# Patient Record
Sex: MALE | Race: BLACK OR AFRICAN AMERICAN | NOT HISPANIC OR LATINO | URBAN - METROPOLITAN AREA
[De-identification: names, ages, dates, MRNs, and addresses within clinical notes are randomized per-mention and may not be internally consistent; named-entity substitution may affect disease eponyms.]

---

## 2017-08-10 ENCOUNTER — EMERGENCY (EMERGENCY)
Facility: HOSPITAL | Age: 72
LOS: 1 days | Discharge: HOME | End: 2017-08-10
Attending: EMERGENCY MEDICINE | Admitting: EMERGENCY MEDICINE
Payer: MEDICARE

## 2017-08-10 VITALS
DIASTOLIC BLOOD PRESSURE: 84 MMHG | SYSTOLIC BLOOD PRESSURE: 178 MMHG | OXYGEN SATURATION: 100 % | TEMPERATURE: 98 F | HEART RATE: 77 BPM | RESPIRATION RATE: 15 BRPM

## 2017-08-10 VITALS
DIASTOLIC BLOOD PRESSURE: 87 MMHG | TEMPERATURE: 98 F | HEART RATE: 80 BPM | RESPIRATION RATE: 16 BRPM | OXYGEN SATURATION: 99 % | SYSTOLIC BLOOD PRESSURE: 183 MMHG

## 2017-08-10 LAB
ALBUMIN SERPL ELPH-MCNC: 4.1 G/DL — SIGNIFICANT CHANGE UP (ref 3.3–5)
ALP SERPL-CCNC: 70 U/L — SIGNIFICANT CHANGE UP (ref 40–120)
ALT FLD-CCNC: 12 U/L — SIGNIFICANT CHANGE UP (ref 4–41)
APTT BLD: 30.4 SEC — SIGNIFICANT CHANGE UP (ref 27.5–37.4)
AST SERPL-CCNC: 15 U/L — SIGNIFICANT CHANGE UP (ref 4–40)
BASOPHILS # BLD AUTO: 0.02 K/UL — SIGNIFICANT CHANGE UP (ref 0–0.2)
BASOPHILS NFR BLD AUTO: 0.5 % — SIGNIFICANT CHANGE UP (ref 0–2)
BILIRUB SERPL-MCNC: 0.4 MG/DL — SIGNIFICANT CHANGE UP (ref 0.2–1.2)
BUN SERPL-MCNC: 15 MG/DL — SIGNIFICANT CHANGE UP (ref 7–23)
CALCIUM SERPL-MCNC: 9.4 MG/DL — SIGNIFICANT CHANGE UP (ref 8.4–10.5)
CHLORIDE SERPL-SCNC: 103 MMOL/L — SIGNIFICANT CHANGE UP (ref 98–107)
CK MB BLD-MCNC: 3.25 NG/ML — SIGNIFICANT CHANGE UP (ref 1–6.6)
CK MB BLD-MCNC: SIGNIFICANT CHANGE UP (ref 0–2.5)
CK SERPL-CCNC: 131 U/L — SIGNIFICANT CHANGE UP (ref 30–200)
CO2 SERPL-SCNC: 28 MMOL/L — SIGNIFICANT CHANGE UP (ref 22–31)
CREAT SERPL-MCNC: 1.13 MG/DL — SIGNIFICANT CHANGE UP (ref 0.5–1.3)
EOSINOPHIL # BLD AUTO: 0.13 K/UL — SIGNIFICANT CHANGE UP (ref 0–0.5)
EOSINOPHIL NFR BLD AUTO: 3 % — SIGNIFICANT CHANGE UP (ref 0–6)
GLUCOSE SERPL-MCNC: 359 MG/DL — HIGH (ref 70–99)
HCT VFR BLD CALC: 38.5 % — LOW (ref 39–50)
HGB BLD-MCNC: 12.5 G/DL — LOW (ref 13–17)
IMM GRANULOCYTES # BLD AUTO: 0.01 # — SIGNIFICANT CHANGE UP
IMM GRANULOCYTES NFR BLD AUTO: 0.2 % — SIGNIFICANT CHANGE UP (ref 0–1.5)
INR BLD: 1.02 — SIGNIFICANT CHANGE UP (ref 0.88–1.17)
LYMPHOCYTES # BLD AUTO: 1.16 K/UL — SIGNIFICANT CHANGE UP (ref 1–3.3)
LYMPHOCYTES # BLD AUTO: 26.5 % — SIGNIFICANT CHANGE UP (ref 13–44)
MCHC RBC-ENTMCNC: 27.6 PG — SIGNIFICANT CHANGE UP (ref 27–34)
MCHC RBC-ENTMCNC: 32.5 % — SIGNIFICANT CHANGE UP (ref 32–36)
MCV RBC AUTO: 85 FL — SIGNIFICANT CHANGE UP (ref 80–100)
MONOCYTES # BLD AUTO: 0.39 K/UL — SIGNIFICANT CHANGE UP (ref 0–0.9)
MONOCYTES NFR BLD AUTO: 8.9 % — SIGNIFICANT CHANGE UP (ref 2–14)
NEUTROPHILS # BLD AUTO: 2.67 K/UL — SIGNIFICANT CHANGE UP (ref 1.8–7.4)
NEUTROPHILS NFR BLD AUTO: 60.9 % — SIGNIFICANT CHANGE UP (ref 43–77)
NRBC # FLD: 0 — SIGNIFICANT CHANGE UP
PLATELET # BLD AUTO: 208 K/UL — SIGNIFICANT CHANGE UP (ref 150–400)
PMV BLD: 9.5 FL — SIGNIFICANT CHANGE UP (ref 7–13)
POTASSIUM SERPL-MCNC: 3.6 MMOL/L — SIGNIFICANT CHANGE UP (ref 3.5–5.3)
POTASSIUM SERPL-SCNC: 3.6 MMOL/L — SIGNIFICANT CHANGE UP (ref 3.5–5.3)
PROT SERPL-MCNC: 6.9 G/DL — SIGNIFICANT CHANGE UP (ref 6–8.3)
PROTHROM AB SERPL-ACNC: 11.4 SEC — SIGNIFICANT CHANGE UP (ref 9.8–13.1)
RBC # BLD: 4.53 M/UL — SIGNIFICANT CHANGE UP (ref 4.2–5.8)
RBC # FLD: 12.6 % — SIGNIFICANT CHANGE UP (ref 10.3–14.5)
SODIUM SERPL-SCNC: 141 MMOL/L — SIGNIFICANT CHANGE UP (ref 135–145)
TROPONIN T SERPL-MCNC: < 0.06 NG/ML — SIGNIFICANT CHANGE UP (ref 0–0.06)
WBC # BLD: 4.38 K/UL — SIGNIFICANT CHANGE UP (ref 3.8–10.5)
WBC # FLD AUTO: 4.38 K/UL — SIGNIFICANT CHANGE UP (ref 3.8–10.5)

## 2017-08-10 PROCEDURE — 72170 X-RAY EXAM OF PELVIS: CPT | Mod: 26

## 2017-08-10 PROCEDURE — 99284 EMERGENCY DEPT VISIT MOD MDM: CPT | Mod: GC

## 2017-08-10 RX ORDER — ACETAMINOPHEN 500 MG
650 TABLET ORAL ONCE
Qty: 0 | Refills: 0 | Status: DISCONTINUED | OUTPATIENT
Start: 2017-08-10 | End: 2017-08-14

## 2017-08-10 NOTE — ED PROVIDER NOTE - MEDICAL DECISION MAKING DETAILS
72 yo M presenting 4 weeks s/p fall from rolling off bed. Pt walks independently since.  Pt had some pain in left hip (4/10 in severity) since the fall but it worsened last night (to 7/10). Pt denies LOC, no head trauma with fall. Stable in ED, vitals normal other than elevated BP. No other symptoms other than hip pain which is located on the iliac bone on the lateral left side. xray hip shows no fracture. Discharging pt with follow-up with his PMD.

## 2017-08-10 NOTE — ED ADULT TRIAGE NOTE - CHIEF COMPLAINT QUOTE
Pt states he rolled off of bed last night. Denies any kind of syncope or dizziness. Pt c.o left sided pain. Pt takes ASA daily. Pt also c/o productive cough and constipation.

## 2017-08-10 NOTE — ED PROVIDER NOTE - PLAN OF CARE
Explained to pt that xray shows no fracture where his pain is located. Pt should follow-up with PMD in 24 hours. Explained the pt can take tylenol for pain as needed. Pt should return to the ED if pt experiences increase in pain, weakness, or dizziness.

## 2017-08-10 NOTE — ED PROVIDER NOTE - MUSCULOSKELETAL MINIMAL EXAM
L hip flexion limited 2/2 pain, otherwise FROM in L hip; no thoracic or lumbar midline TTP/RANGE OF MOTION LIMITED

## 2017-08-10 NOTE — ED PROVIDER NOTE - CARE PLAN
Principal Discharge DX:	Hip pain, left  Instructions for follow-up, activity and diet:	Explained to pt that xray shows no fracture where his pain is located. Pt should follow-up with PMD in 24 hours. Explained the pt can take tylenol for pain as needed. Pt should return to the ED if pt experiences increase in pain, weakness, or dizziness.

## 2017-08-10 NOTE — ED PROVIDER NOTE - OBJECTIVE STATEMENT
Pt is a 72 yo M presenting 4 weeks s/p fall. Pt fell out of bed 4 weeks ago while sleeping "too close to the edge of the bed." No hx of falls. Pt walks independently. Pt fell onto his left side. Pt had some pain in left hip (about 4/10 in severity) since the fall but it worsened last night (to 7/10). Pt has taken tylenol for the pain but it didn't improve pain. Pain from L hip is constant and sharp and sometimes radiates to the left lower lateral back. Pt denies LOC, no head trauma     Meds: carvedilol, atorvastatin, novalog, lantus    PCP: Luke  Pharmacy: [facility] [address] [place] [address] Pt is a 70 yo M presenting 4 weeks s/p fall. Pt fell out of bed 4 weeks ago while sleeping "too close to the edge of the bed." No hx of falls. Pt walks independently. Pt fell onto his left side. Pt had some pain in left hip (about 4/10 in severity) since the fall but it worsened last night (to 7/10). Pt has taken tylenol for the pain but it didn't improve pain. Pain from L hip is constant and sharp and sometimes radiates to the left lower lateral back. Pt denies LOC, no head trauma. Pt has had some gait instability since the original fall but no falls since then.    Meds: carvedilol, atorvastatin, novalog, lantus    PCP: Luke  Pharmacy: [facility] [address] [place] [address]

## 2017-08-10 NOTE — ED PROVIDER NOTE - PROGRESS NOTE DETAILS
[place] - Reassessed pt. Stable, feeling well, vitals nl. Pain is at a zero when sitting, now improved when standing. Explained to pt that xray shows no fracture. Pt should follow-up with PMD in 24 hours. Explained the pt can take tylenol for pain as needed.

## 2017-08-10 NOTE — ED ADULT NURSE NOTE - OBJECTIVE STATEMENT
Eve RN- pt c/o of left sided rib pain s/p mechanical fall 3 weeks ago; pt states he rolled out of bed in his sleep. pt on aspirin, has hx of two MI's, (unsure of the year), DM, HTN. pt is a/ox3, ambulatory at baseline, vitally stable in NAD. pt has alzheimer's, with short term memory loss. IV 20g placed to right AC, labs sent. had off report given to RN, A. Symes. will continue to monitor

## 2017-12-26 ENCOUNTER — EMERGENCY (EMERGENCY)
Facility: HOSPITAL | Age: 72
LOS: 0 days | Discharge: HOME | End: 2017-12-26
Attending: EMERGENCY MEDICINE | Admitting: EMERGENCY MEDICINE
Payer: COMMERCIAL

## 2017-12-26 VITALS
HEIGHT: 75 IN | WEIGHT: 250 LBS | SYSTOLIC BLOOD PRESSURE: 166 MMHG | TEMPERATURE: 98 F | DIASTOLIC BLOOD PRESSURE: 93 MMHG | HEART RATE: 62 BPM | OXYGEN SATURATION: 100 % | RESPIRATION RATE: 17 BRPM

## 2017-12-26 VITALS
HEART RATE: 70 BPM | OXYGEN SATURATION: 99 % | TEMPERATURE: 98 F | RESPIRATION RATE: 18 BRPM | DIASTOLIC BLOOD PRESSURE: 78 MMHG | SYSTOLIC BLOOD PRESSURE: 154 MMHG

## 2017-12-26 DIAGNOSIS — E11.649 TYPE 2 DIABETES MELLITUS WITH HYPOGLYCEMIA WITHOUT COMA: ICD-10-CM

## 2017-12-26 DIAGNOSIS — Z79.4 LONG TERM (CURRENT) USE OF INSULIN: ICD-10-CM

## 2017-12-26 PROCEDURE — 99285 EMERGENCY DEPT VISIT HI MDM: CPT

## 2017-12-26 NOTE — ED ADULT NURSE NOTE - OBJECTIVE STATEMENT
Patient BIB ambulance with c/o hypoglycemia. Per patient, he checked his sugar this morning and it was 67, gave himself Humalog 24 units, had breakfast. Later was found with tongue sticking out and barely responsive. FS as per EMS was 21, was given D50 amp, increased to 98 in the field. Fs at ED 80. Meal provided. #20 to left ac prior to arrival. Patient presents to ED A&Ox4, but forgetful. Daughter at bedside.

## 2017-12-26 NOTE — ED PROVIDER NOTE - MEDICAL DECISION MAKING DETAILS
patient observed in the ED approximately 2 hrs with BGM improvement. patient appropriate for discharge home. precautions when to return to the ED given and understood.

## 2017-12-26 NOTE — ED ADULT TRIAGE NOTE - CHIEF COMPLAINT QUOTE
pt took too much insulin this morning, blood sugar was 21 upon ems arrival and was 80 upon arrival to ED after 1 amp of D50 given by EMS

## 2017-12-26 NOTE — ED PROVIDER NOTE - OBJECTIVE STATEMENT
71 y/o M with a PMHx of DM on insulin, HTN presents to the ED c/o hypoglycemic episode that occurred earlier today while at home. Pt states that his BGM when he woke up this morning was in the 60s, he ate, took his insulin, began to shake later in the morning. EMS states that upon their arrival his BGM was in the 20s, 1amp Dx50 given, BGM in 80s upon arrival to the ED. Pt currently awake, alert here in ED and denies pain, fever, cough, chills, NVD, abd pain, CP or any other acute c/o at this time.

## 2018-07-16 PROBLEM — I21.3 ST ELEVATION (STEMI) MYOCARDIAL INFARCTION OF UNSPECIFIED SITE: Chronic | Status: ACTIVE | Noted: 2017-08-10

## 2018-09-21 NOTE — ED ADULT NURSE NOTE - NS ED PATIENT SAFETY CONCERN
"Prescription approved per [facility] Refill Protocol.     SUKUMAR-7 SCORE 11/20/2015 2/10/2017 3/14/2018   Total Score - - -   Total Score - - 0 (minimal anxiety)   Total Score 1 0 0     Requested Prescriptions   Pending Prescriptions Disp Refills     sertraline (ZOLOFT) 100 MG tablet [Pharmacy Med Name: SERTRALINE  MG TABLET] 90 tablet 1     Sig: TAKE 1 TABLET (100 MG) BY MOUTH DAILY    SSRIs Protocol Passed    9/21/2018 12:24 PM       Passed - Recent (12 mo) or future (30 days) visit within the authorizing provider's specialty    Patient had office visit in the last 12 months or has a visit in the next 30 days with authorizing provider or within the authorizing provider's specialty.  See \"Patient Info\" tab in inbasket, or \"Choose Columns\" in Meds & Orders section of the refill encounter.           Passed - Patient is age 18 or older        Kerrie Beaver RN      " No

## 2019-11-05 ENCOUNTER — INPATIENT (INPATIENT)
Facility: HOSPITAL | Age: 74
LOS: 2 days | Discharge: HOME HEALTH | End: 2019-11-08
Attending: HOSPITALIST | Admitting: HOSPITALIST
Payer: MEDICARE

## 2019-11-05 VITALS
HEART RATE: 81 BPM | SYSTOLIC BLOOD PRESSURE: 161 MMHG | TEMPERATURE: 98 F | DIASTOLIC BLOOD PRESSURE: 89 MMHG | RESPIRATION RATE: 18 BRPM | OXYGEN SATURATION: 100 %

## 2019-11-05 DIAGNOSIS — Z29.9 ENCOUNTER FOR PROPHYLACTIC MEASURES, UNSPECIFIED: ICD-10-CM

## 2019-11-05 DIAGNOSIS — D72.829 ELEVATED WHITE BLOOD CELL COUNT, UNSPECIFIED: ICD-10-CM

## 2019-11-05 DIAGNOSIS — F03.90 UNSPECIFIED DEMENTIA WITHOUT BEHAVIORAL DISTURBANCE: ICD-10-CM

## 2019-11-05 DIAGNOSIS — E11.649 TYPE 2 DIABETES MELLITUS WITH HYPOGLYCEMIA WITHOUT COMA: ICD-10-CM

## 2019-11-05 DIAGNOSIS — Z02.9 ENCOUNTER FOR ADMINISTRATIVE EXAMINATIONS, UNSPECIFIED: ICD-10-CM

## 2019-11-05 DIAGNOSIS — E16.2 HYPOGLYCEMIA, UNSPECIFIED: ICD-10-CM

## 2019-11-05 DIAGNOSIS — E87.6 HYPOKALEMIA: ICD-10-CM

## 2019-11-05 DIAGNOSIS — I63.9 CEREBRAL INFARCTION, UNSPECIFIED: ICD-10-CM

## 2019-11-05 DIAGNOSIS — I25.10 ATHEROSCLEROTIC HEART DISEASE OF NATIVE CORONARY ARTERY WITHOUT ANGINA PECTORIS: Chronic | ICD-10-CM

## 2019-11-05 DIAGNOSIS — I10 ESSENTIAL (PRIMARY) HYPERTENSION: ICD-10-CM

## 2019-11-05 DIAGNOSIS — I25.10 ATHEROSCLEROTIC HEART DISEASE OF NATIVE CORONARY ARTERY WITHOUT ANGINA PECTORIS: ICD-10-CM

## 2019-11-05 DIAGNOSIS — N18.3 CHRONIC KIDNEY DISEASE, STAGE 3 (MODERATE): ICD-10-CM

## 2019-11-05 PROBLEM — E11.9 TYPE 2 DIABETES MELLITUS WITHOUT COMPLICATIONS: Chronic | Status: ACTIVE | Noted: 2017-12-26

## 2019-11-05 PROBLEM — E11.9 TYPE 2 DIABETES MELLITUS WITHOUT COMPLICATIONS: Chronic | Status: ACTIVE | Noted: 2017-08-10

## 2019-11-05 LAB
ALBUMIN SERPL ELPH-MCNC: 4.5 G/DL — SIGNIFICANT CHANGE UP (ref 3.3–5)
ALP SERPL-CCNC: 63 U/L — SIGNIFICANT CHANGE UP (ref 40–120)
ALT FLD-CCNC: 11 U/L — SIGNIFICANT CHANGE UP (ref 4–41)
ANION GAP SERPL CALC-SCNC: 12 MMO/L — SIGNIFICANT CHANGE UP (ref 7–14)
APPEARANCE UR: CLEAR — SIGNIFICANT CHANGE UP
AST SERPL-CCNC: 14 U/L — SIGNIFICANT CHANGE UP (ref 4–40)
BASE EXCESS BLDV CALC-SCNC: 7 MMOL/L — SIGNIFICANT CHANGE UP
BASOPHILS # BLD AUTO: 0.04 K/UL — SIGNIFICANT CHANGE UP (ref 0–0.2)
BASOPHILS NFR BLD AUTO: 0.2 % — SIGNIFICANT CHANGE UP (ref 0–2)
BILIRUB SERPL-MCNC: 0.5 MG/DL — SIGNIFICANT CHANGE UP (ref 0.2–1.2)
BILIRUB UR-MCNC: NEGATIVE — SIGNIFICANT CHANGE UP
BLOOD GAS VENOUS - CREATININE: 1.18 MG/DL — SIGNIFICANT CHANGE UP (ref 0.5–1.3)
BLOOD UR QL VISUAL: NEGATIVE — SIGNIFICANT CHANGE UP
BUN SERPL-MCNC: 22 MG/DL — SIGNIFICANT CHANGE UP (ref 7–23)
CALCIUM SERPL-MCNC: 9.8 MG/DL — SIGNIFICANT CHANGE UP (ref 8.4–10.5)
CHLORIDE BLDV-SCNC: 102 MMOL/L — SIGNIFICANT CHANGE UP (ref 96–108)
CHLORIDE SERPL-SCNC: 99 MMOL/L — SIGNIFICANT CHANGE UP (ref 98–107)
CO2 SERPL-SCNC: 30 MMOL/L — SIGNIFICANT CHANGE UP (ref 22–31)
COLOR SPEC: COLORLESS — SIGNIFICANT CHANGE UP
CREAT SERPL-MCNC: 1.24 MG/DL — SIGNIFICANT CHANGE UP (ref 0.5–1.3)
EOSINOPHIL # BLD AUTO: 0.08 K/UL — SIGNIFICANT CHANGE UP (ref 0–0.5)
EOSINOPHIL NFR BLD AUTO: 0.5 % — SIGNIFICANT CHANGE UP (ref 0–6)
GAS PNL BLDV: 138 MMOL/L — SIGNIFICANT CHANGE UP (ref 136–146)
GLUCOSE BLDC GLUCOMTR-MCNC: 123 MG/DL — HIGH (ref 70–99)
GLUCOSE BLDC GLUCOMTR-MCNC: 211 MG/DL — HIGH (ref 70–99)
GLUCOSE BLDV-MCNC: 170 MG/DL — HIGH (ref 70–99)
GLUCOSE SERPL-MCNC: 168 MG/DL — HIGH (ref 70–99)
GLUCOSE UR-MCNC: 200 — HIGH
HCO3 BLDV-SCNC: 27 MMOL/L — SIGNIFICANT CHANGE UP (ref 20–27)
HCT VFR BLD CALC: 42.2 % — SIGNIFICANT CHANGE UP (ref 39–50)
HCT VFR BLDV CALC: 40.4 % — SIGNIFICANT CHANGE UP (ref 39–51)
HGB BLD-MCNC: 13.1 G/DL — SIGNIFICANT CHANGE UP (ref 13–17)
HGB BLDV-MCNC: 13.1 G/DL — SIGNIFICANT CHANGE UP (ref 13–17)
IMM GRANULOCYTES NFR BLD AUTO: 0.6 % — SIGNIFICANT CHANGE UP (ref 0–1.5)
KETONES UR-MCNC: NEGATIVE — SIGNIFICANT CHANGE UP
LACTATE BLDV-MCNC: 1.8 MMOL/L — SIGNIFICANT CHANGE UP (ref 0.5–2)
LEUKOCYTE ESTERASE UR-ACNC: NEGATIVE — SIGNIFICANT CHANGE UP
LYMPHOCYTES # BLD AUTO: 0.69 K/UL — LOW (ref 1–3.3)
LYMPHOCYTES # BLD AUTO: 4.2 % — LOW (ref 13–44)
MCHC RBC-ENTMCNC: 27 PG — SIGNIFICANT CHANGE UP (ref 27–34)
MCHC RBC-ENTMCNC: 31 % — LOW (ref 32–36)
MCV RBC AUTO: 87 FL — SIGNIFICANT CHANGE UP (ref 80–100)
MONOCYTES # BLD AUTO: 0.8 K/UL — SIGNIFICANT CHANGE UP (ref 0–0.9)
MONOCYTES NFR BLD AUTO: 4.9 % — SIGNIFICANT CHANGE UP (ref 2–14)
NEUTROPHILS # BLD AUTO: 14.72 K/UL — HIGH (ref 1.8–7.4)
NEUTROPHILS NFR BLD AUTO: 89.6 % — HIGH (ref 43–77)
NITRITE UR-MCNC: NEGATIVE — SIGNIFICANT CHANGE UP
NRBC # FLD: 0 K/UL — SIGNIFICANT CHANGE UP (ref 0–0)
PCO2 BLDV: 66 MMHG — HIGH (ref 41–51)
PH BLDV: 7.32 PH — SIGNIFICANT CHANGE UP (ref 7.32–7.43)
PH UR: 7 — SIGNIFICANT CHANGE UP (ref 5–8)
PLATELET # BLD AUTO: 277 K/UL — SIGNIFICANT CHANGE UP (ref 150–400)
PMV BLD: 9.5 FL — SIGNIFICANT CHANGE UP (ref 7–13)
PO2 BLDV: < 24 MMHG — LOW (ref 35–40)
POTASSIUM BLDV-SCNC: 4.5 MMOL/L — SIGNIFICANT CHANGE UP (ref 3.4–4.5)
POTASSIUM SERPL-MCNC: 3.4 MMOL/L — LOW (ref 3.5–5.3)
POTASSIUM SERPL-SCNC: 3.4 MMOL/L — LOW (ref 3.5–5.3)
PROT SERPL-MCNC: 7.6 G/DL — SIGNIFICANT CHANGE UP (ref 6–8.3)
PROT UR-MCNC: 10 — SIGNIFICANT CHANGE UP
RBC # BLD: 4.85 M/UL — SIGNIFICANT CHANGE UP (ref 4.2–5.8)
RBC # FLD: 12.8 % — SIGNIFICANT CHANGE UP (ref 10.3–14.5)
SAO2 % BLDV: 22 % — LOW (ref 60–85)
SODIUM SERPL-SCNC: 141 MMOL/L — SIGNIFICANT CHANGE UP (ref 135–145)
SP GR SPEC: 1.01 — SIGNIFICANT CHANGE UP (ref 1–1.04)
UROBILINOGEN FLD QL: NORMAL — SIGNIFICANT CHANGE UP
WBC # BLD: 16.43 K/UL — HIGH (ref 3.8–10.5)
WBC # FLD AUTO: 16.43 K/UL — HIGH (ref 3.8–10.5)

## 2019-11-05 PROCEDURE — 71045 X-RAY EXAM CHEST 1 VIEW: CPT | Mod: 26

## 2019-11-05 PROCEDURE — 99223 1ST HOSP IP/OBS HIGH 75: CPT

## 2019-11-05 RX ORDER — DEXTROSE 50 % IN WATER 50 %
25 SYRINGE (ML) INTRAVENOUS ONCE
Refills: 0 | Status: DISCONTINUED | OUTPATIENT
Start: 2019-11-05 | End: 2019-11-08

## 2019-11-05 RX ORDER — POTASSIUM CHLORIDE 20 MEQ
20 PACKET (EA) ORAL ONCE
Refills: 0 | Status: COMPLETED | OUTPATIENT
Start: 2019-11-05 | End: 2019-11-05

## 2019-11-05 RX ORDER — DEXTROSE 50 % IN WATER 50 %
12.5 SYRINGE (ML) INTRAVENOUS ONCE
Refills: 0 | Status: DISCONTINUED | OUTPATIENT
Start: 2019-11-05 | End: 2019-11-08

## 2019-11-05 RX ORDER — INSULIN LISPRO 100/ML
VIAL (ML) SUBCUTANEOUS
Refills: 0 | Status: DISCONTINUED | OUTPATIENT
Start: 2019-11-05 | End: 2019-11-08

## 2019-11-05 RX ORDER — GLUCAGON INJECTION, SOLUTION 0.5 MG/.1ML
1 INJECTION, SOLUTION SUBCUTANEOUS ONCE
Refills: 0 | Status: DISCONTINUED | OUTPATIENT
Start: 2019-11-05 | End: 2019-11-08

## 2019-11-05 RX ORDER — DEXTROSE 50 % IN WATER 50 %
15 SYRINGE (ML) INTRAVENOUS ONCE
Refills: 0 | Status: DISCONTINUED | OUTPATIENT
Start: 2019-11-05 | End: 2019-11-08

## 2019-11-05 RX ORDER — INSULIN ASPART 100 [IU]/ML
15 INJECTION, SOLUTION SUBCUTANEOUS
Qty: 0 | Refills: 0 | DISCHARGE

## 2019-11-05 RX ORDER — INSULIN GLARGINE 100 [IU]/ML
0 INJECTION, SOLUTION SUBCUTANEOUS
Qty: 0 | Refills: 0 | DISCHARGE

## 2019-11-05 RX ORDER — SODIUM CHLORIDE 9 MG/ML
1000 INJECTION, SOLUTION INTRAVENOUS
Refills: 0 | Status: DISCONTINUED | OUTPATIENT
Start: 2019-11-05 | End: 2019-11-08

## 2019-11-05 RX ORDER — ASPIRIN/CALCIUM CARB/MAGNESIUM 324 MG
81 TABLET ORAL DAILY
Refills: 0 | Status: DISCONTINUED | OUTPATIENT
Start: 2019-11-06 | End: 2019-11-08

## 2019-11-05 RX ORDER — ATORVASTATIN CALCIUM 80 MG/1
40 TABLET, FILM COATED ORAL AT BEDTIME
Refills: 0 | Status: DISCONTINUED | OUTPATIENT
Start: 2019-11-05 | End: 2019-11-08

## 2019-11-05 RX ORDER — LOSARTAN POTASSIUM 100 MG/1
100 TABLET, FILM COATED ORAL DAILY
Refills: 0 | Status: DISCONTINUED | OUTPATIENT
Start: 2019-11-06 | End: 2019-11-08

## 2019-11-05 RX ORDER — CARVEDILOL PHOSPHATE 80 MG/1
12.5 CAPSULE, EXTENDED RELEASE ORAL EVERY 12 HOURS
Refills: 0 | Status: DISCONTINUED | OUTPATIENT
Start: 2019-11-05 | End: 2019-11-08

## 2019-11-05 RX ORDER — INSULIN LISPRO 100/ML
0 VIAL (ML) SUBCUTANEOUS
Qty: 0 | Refills: 0 | DISCHARGE

## 2019-11-05 RX ORDER — INSULIN LISPRO 100/ML
VIAL (ML) SUBCUTANEOUS AT BEDTIME
Refills: 0 | Status: DISCONTINUED | OUTPATIENT
Start: 2019-11-05 | End: 2019-11-08

## 2019-11-05 RX ORDER — NIFEDIPINE 30 MG
30 TABLET, EXTENDED RELEASE 24 HR ORAL DAILY
Refills: 0 | Status: DISCONTINUED | OUTPATIENT
Start: 2019-11-06 | End: 2019-11-08

## 2019-11-05 RX ADMIN — Medication 2: at 18:00

## 2019-11-05 RX ADMIN — ATORVASTATIN CALCIUM 40 MILLIGRAM(S): 80 TABLET, FILM COATED ORAL at 21:40

## 2019-11-05 RX ADMIN — CARVEDILOL PHOSPHATE 12.5 MILLIGRAM(S): 80 CAPSULE, EXTENDED RELEASE ORAL at 18:11

## 2019-11-05 RX ADMIN — Medication 20 MILLIEQUIVALENT(S): at 14:37

## 2019-11-05 NOTE — H&P ADULT - PROBLEM SELECTOR PLAN 6
-resume home BP meds: -resume home BP meds: nifedipine, losartan and coreg  -given med non-compliance, will hold hctz for now and resume hctz if BP can tolerate

## 2019-11-05 NOTE — ED PROVIDER NOTE - ATTENDING CONTRIBUTION TO CARE
agree with resident note  "72 yo M c PMH of CVA (c residual memory loss), HTN, IDDM, CAD (s/p stent x 1) BIBEMS from day program, per daughter (whom he lives with), said his blood sugar this morning was unknown level, pt states he took "20 units" of insulin this morning before breakfast and took "10 units" last night, after which he had crackers, chocolate (per daughter). "  per conversation with daughter this happens approx 2 times a month for last year.  Pt has been suffering with worsening memory loss.  Today she states may have taken an extra vial.  When EMS gave glucagon pt returned to normal.    PE: well appearing; VSS: CTAB/L; s1 s2 no m/r/g abd soft/NT/ND ext: no edema    Imp: pt given memory loss (has baseline confusion), probably not safe to be discharged home; to be admitted for SW; (HHA to administer meds?, NH placement); feel pt at high risk for catastrophic event if allowed to continue to administer insulin by himself

## 2019-11-05 NOTE — H&P ADULT - NSHPPHYSICALEXAM_GEN_ALL_CORE
Vital Signs Last 24 Hrs  T(C): 36.7 (11-05-19 @ 11:39), Max: 36.7 (11-05-19 @ 11:39)  T(F): 98 (11-05-19 @ 11:39), Max: 98 (11-05-19 @ 11:39)  HR: 81 (11-05-19 @ 11:39) (81 - 81)  BP: 161/89 (11-05-19 @ 11:39) (161/89 - 161/89)  BP(mean): --  RR: 18 (11-05-19 @ 11:39) (18 - 18)  SpO2: 100% (11-05-19 @ 11:39) (100% - 100%)    GENERAL: NAD  HEENT: EOMI, MMM, no oropharyngeal lesions or erythema appreciated  Pulm: normal work of breathing, CTABL  CV: RRR, S1&S2+, no m/r/g appreciated  ABDOMEN: soft, nt, nd,   MSK: nl ROM  EXTREMITIES:  no appreciable edema in b/l LE  Neuro: A&Ox2, no focal deficits  SKIN: warm and dry, no visible rash

## 2019-11-05 NOTE — H&P ADULT - HISTORY OF PRESENT ILLNESS
73M h/o HTN, T2DM, CAD (s/p stent), CVA (residual memory loss, AAOx2), BIBEMS from day program, for hypoglycemia to 27 with associated confusion and sweating. Resolved after administration of glucagon and glucopaste. He lives with daughter who reports that he possibly took double the amount of insulin this morning and ate less of breakfast. This has happened a couple times this past month. Daughter who tries to help him administer his meds but has been having difficulty taking care of patient at home. Rosemary kernskes novalog 15U BID after meals, 24U basaglar at night. Currently, patient reports feeling back to baseline.   PMD: Leonardo ([facility] [place])  daughter: [phone] 73M h/o HTN, T2DM, CAD (s/p stent), CVA (residual memory loss, AAOx2), BIBEMS from day program, for hypoglycemia to 27 with associated confusion and sweating. Resolved after administration of glucagon and glucopaste. He lives with daughter who reports that he possibly took double the amount of insulin this morning and ate less of breakfast. This has happened a couple times this past month. Daughter who tries to help him administer his meds but has been having difficulty taking care of patient at home. Patient takes novalog 15U BID after meals, 25U basaglar at night. Currently, patient reports feeling back to baseline. Patient has his meds at bedside and reports taking losartan and nifedepine today, but admits to not being compliant with his BP meds on a daily basis.   PMD: Leonardo ([facility])  daughter: [phone]

## 2019-11-05 NOTE — H&P ADULT - ASSESSMENT
73M h/o HTN, T2DM, CAD (s/p stent), CVA (residual memory loss, AAOx2), BIBEMS from day program, for hypoglycemia to 27 with associated confusion and sweating. Resolved after administration of glucagon and glucopaste, likely 2/2 med non-compliance in setting of worsening dementia, a/f diabetic management and SW eval for possible NH placement:

## 2019-11-05 NOTE — H&P ADULT - PROBLEM SELECTOR PLAN 3
-AAOx2 currently. no behavioral disturbance reported at home   -will check b12, TSH   -would benefit from outpt geriatric assessment, daughter wants to transition to new PMD within [facility] if possible (they live in [place])   -PT cici ordered -AAOx2 currently. no behavioral disturbance reported at home   -will check b12, TSH   -would benefit from outpt geriatric assessment, daughter wants to transition to new PMD within [facility] if possible (they live in [place]). also would benefit from use of pill box   -PT eval ordered

## 2019-11-05 NOTE — H&P ADULT - PROBLEM SELECTOR PLAN 10
Transitions of Care Status:  1.  Name of PCP: Dr. Patterson  2.  PCP Contacted on Admission: [x ] Y    [ ] N    3.  PCP contacted at Discharge: [ ] Y    [ ] N    [ ] N/A  4.  Post-Discharge Appointment Date and Location:  5.  Summary of Handoff given to PCP: Transitions of Care Status:  1.  Name of PCP: Dr. Patterson [phone]  2.  PCP Contacted on Admission: [x ] Y    [ ] N    3.  PCP contacted at Discharge: [ ] Y    [ ] N    [ ] N/A  4.  Post-Discharge Appointment Date and Location:  5.  Summary of Handoff given to PCP:

## 2019-11-05 NOTE — ED ADULT TRIAGE NOTE - CHIEF COMPLAINT QUOTE
Pt coming from day program for adults for altered mental status, EMS found pt to be hypoglycemic gave pt gluco-paste and Glucophage IM, pt is now a&ox3, denies any present pain, breathing even and unlabored, denies headache/dizziness. Pt coming from day program for adults for altered mental status, EMS found pt to be hypoglycemic gave pt gluco-paste and Glucagon IM, pt is now a&ox3, denies any present pain, breathing even and unlabored, denies headache/dizziness.

## 2019-11-05 NOTE — ED PROVIDER NOTE - OBJECTIVE STATEMENT
74 yo M c PMH of CVA (c residual memory loss), HTN, IDDM, CAD (s/p stent x 1) BIBEMS from day program, per daughter (whom he lives with), said his blood sugar this morning was unknown level, pt states he took "20 units" of insulin this morning before breakfast and took "10 units" last night, after which he had crackers, chocolate (per daughter). Pt was angry about something left the house without his lunch and he walked to his day program. Per daughter, pt only is supposed to take novalog BID after meals but sometimes he gets confused and she thinks he took double his does this morning before breakfast and then daughter was called that pt was sweaty and confused. Per triage note pt was hypoglycemic to 27 by EMS, who gave him glucagon, glucopaste, and then his sx resolved. Similar sx last Saturday, happens once or twice a month. no recent med changes. lives with daughter who tries to help him administer his meds.     home meds: 15 u novalog BID after meals, 24 u basaglar     PMD: Leonardo ([facility])    daughter: [phone]

## 2019-11-05 NOTE — H&P ADULT - PROBLEM SELECTOR PROBLEM 4
Coronary artery disease without angina pectoris, unspecified vessel or lesion type, unspecified whether native or transplanted heart Hypokalemia

## 2019-11-05 NOTE — PATIENT PROFILE ADULT - NSTOBACCONEVERSMOKERY/N_GEN_A
"Please fax my clinic note dated 6/11/2018 to Ms. Maguire's PCP:  Dr. Talia Mejía at [facility]  Please put on the coversheet: \"Arthritis is doing much better since starting methotrexate.  Now reducing prednisone\"  Thank you, Maurilio Hayes MD 6/11/2018 11:08 AM " No

## 2019-11-05 NOTE — ED PROVIDER NOTE - CLINICAL SUMMARY MEDICAL DECISION MAKING FREE TEXT BOX
72 yo M c PMH of CVA (c residual memory loss), HTN, IDDM, CAD (s/p stent x 1) BIBEMS from day program, for hypoglycemia to 27 with associated confusion and sweating which resolved after administration of glucagon and glucopaste, daughter states she thinks he took double the amount of insulin this morning and ate less of breakfast which has happened a couple times a month. on exam, VS grossly, pt A&Ox2, no remarkable exam findings. . orange juice given. pt mentating well. will obtain labs, observe and reassess.

## 2019-11-05 NOTE — H&P ADULT - PROBLEM SELECTOR PROBLEM 5
Cerebrovascular accident (CVA), unspecified mechanism CKD (chronic kidney disease) stage 3, GFR 30-59 ml/min

## 2019-11-05 NOTE — H&P ADULT - PROBLEM SELECTOR PLAN 9
-dvt ppx: improve score 1 (age)- monitor off dvt ppx  -diet DASH/cc   dispo: pending PT, CM/SW, endocrine eval

## 2019-11-05 NOTE — ED PROVIDER NOTE - PSYCHIATRIC, MLM
Alert and oriented to person, place, situation, not oriented to time.  normal mood and affect. no apparent risk to self or others.

## 2019-11-05 NOTE — ED PROVIDER NOTE - PMH
Diabetes mellitus    DM (diabetes mellitus)    HTN (hypertension)    Hypertension    Myocardial infarction  10 years ago, s/p stent x 1

## 2019-11-05 NOTE — H&P ADULT - PROBLEM SELECTOR PROBLEM 7
Discharge planning issues Coronary artery disease without angina pectoris, unspecified vessel or lesion type, unspecified whether native or transplanted heart

## 2019-11-05 NOTE — ED ADULT NURSE NOTE - OBJECTIVE STATEMENT
Pt received from adult day facility, brought in by EMS with c/o hypoglycemia in the 20'2 and 30's per EMS. given gluco-paste and Glucagon by EMS. pt states "I had a coma". Pt endorsed profuse sweating at time of incident. Pt A&Ox2-3, oriented with frequent periods of confusion. Pt ambulatory, in no obvious distress. Endorsed multiple incidents of hypoglycemia, states he took his insulin this morning around 7AM, but "I did not eat enough food". Denies any headache, dizziness, CP or SOB. 20G Angiocath placed on R AC. labs sent. will continue to monitor.

## 2019-11-05 NOTE — ED ADULT NURSE NOTE - CHIEF COMPLAINT QUOTE
Pt coming from day program for adults for altered mental status, EMS found pt to be hypoglycemic gave pt gluco-paste and Glucagon IM, pt is now a&ox3, denies any present pain, breathing even and unlabored, denies headache/dizziness.

## 2019-11-05 NOTE — H&P ADULT - PROBLEM SELECTOR PLAN 2
-leukocytosis without fever or evidence of infection. UA without evidence of UTI, CXR without pna. no acute symptoms   -will continue to monitor off abx, if meets SIRS criteria consider starting abx and obtaining blood cx

## 2019-11-05 NOTE — H&P ADULT - NSHPREVIEWOFSYSTEMS_GEN_ALL_CORE
REVIEW OF SYSTEMS:    CONSTITUTIONAL: No weakness, fevers or chills  EYES/ENT: No visual changes;  no throat pain   NECK: No pain or stiffness  RESPIRATORY: No cough, wheezing, hemoptysis; No shortness of breath  CARDIOVASCULAR: No chest pain or palpitations  GASTROINTESTINAL: No abdominal or epigastric pain. No nausea, vomiting, or hematemesis; No diarrhea or constipation. No melena or hematochezia.  GENITOURINARY: No dysuria, change in frequency or hematuria  NEUROLOGICAL: No numbness or weakness  SKIN: No itching, burning, rashes, or lesions   Psych: No mood changes  MSK: no joint pain  All other review of systems is negative unless indicated above.

## 2019-11-05 NOTE — H&P ADULT - NSICDXPASTMEDICALHX_GEN_ALL_CORE_FT
PAST MEDICAL HISTORY:  Diabetes mellitus     DM (diabetes mellitus)     HTN (hypertension)     Hypertension     Myocardial infarction 10 years ago, s/p stent x 1

## 2019-11-06 DIAGNOSIS — E78.5 HYPERLIPIDEMIA, UNSPECIFIED: ICD-10-CM

## 2019-11-06 LAB
ANION GAP SERPL CALC-SCNC: 12 MMO/L — SIGNIFICANT CHANGE UP (ref 7–14)
BASOPHILS # BLD AUTO: 0.03 K/UL — SIGNIFICANT CHANGE UP (ref 0–0.2)
BASOPHILS NFR BLD AUTO: 0.6 % — SIGNIFICANT CHANGE UP (ref 0–2)
BUN SERPL-MCNC: 24 MG/DL — HIGH (ref 7–23)
CALCIUM SERPL-MCNC: 9.3 MG/DL — SIGNIFICANT CHANGE UP (ref 8.4–10.5)
CHLORIDE SERPL-SCNC: 102 MMOL/L — SIGNIFICANT CHANGE UP (ref 98–107)
CO2 SERPL-SCNC: 28 MMOL/L — SIGNIFICANT CHANGE UP (ref 22–31)
CREAT SERPL-MCNC: 1.32 MG/DL — HIGH (ref 0.5–1.3)
EOSINOPHIL # BLD AUTO: 0.16 K/UL — SIGNIFICANT CHANGE UP (ref 0–0.5)
EOSINOPHIL NFR BLD AUTO: 3.1 % — SIGNIFICANT CHANGE UP (ref 0–6)
GLUCOSE BLDC GLUCOMTR-MCNC: 139 MG/DL — HIGH (ref 70–99)
GLUCOSE BLDC GLUCOMTR-MCNC: 158 MG/DL — HIGH (ref 70–99)
GLUCOSE BLDC GLUCOMTR-MCNC: 233 MG/DL — HIGH (ref 70–99)
GLUCOSE BLDC GLUCOMTR-MCNC: 72 MG/DL — SIGNIFICANT CHANGE UP (ref 70–99)
GLUCOSE SERPL-MCNC: 49 MG/DL — LOW (ref 70–99)
HBA1C BLD-MCNC: 7.9 % — HIGH (ref 4–5.6)
HCT VFR BLD CALC: 39.1 % — SIGNIFICANT CHANGE UP (ref 39–50)
HGB BLD-MCNC: 12.2 G/DL — LOW (ref 13–17)
IMM GRANULOCYTES NFR BLD AUTO: 0.2 % — SIGNIFICANT CHANGE UP (ref 0–1.5)
LYMPHOCYTES # BLD AUTO: 1 K/UL — SIGNIFICANT CHANGE UP (ref 1–3.3)
LYMPHOCYTES # BLD AUTO: 19.1 % — SIGNIFICANT CHANGE UP (ref 13–44)
MAGNESIUM SERPL-MCNC: 1.8 MG/DL — SIGNIFICANT CHANGE UP (ref 1.6–2.6)
MCHC RBC-ENTMCNC: 27 PG — SIGNIFICANT CHANGE UP (ref 27–34)
MCHC RBC-ENTMCNC: 31.2 % — LOW (ref 32–36)
MCV RBC AUTO: 86.5 FL — SIGNIFICANT CHANGE UP (ref 80–100)
MONOCYTES # BLD AUTO: 0.53 K/UL — SIGNIFICANT CHANGE UP (ref 0–0.9)
MONOCYTES NFR BLD AUTO: 10.1 % — SIGNIFICANT CHANGE UP (ref 2–14)
NEUTROPHILS # BLD AUTO: 3.5 K/UL — SIGNIFICANT CHANGE UP (ref 1.8–7.4)
NEUTROPHILS NFR BLD AUTO: 66.9 % — SIGNIFICANT CHANGE UP (ref 43–77)
NRBC # FLD: 0 K/UL — SIGNIFICANT CHANGE UP (ref 0–0)
PHOSPHATE SERPL-MCNC: 2.7 MG/DL — SIGNIFICANT CHANGE UP (ref 2.5–4.5)
PLATELET # BLD AUTO: 259 K/UL — SIGNIFICANT CHANGE UP (ref 150–400)
PMV BLD: 9.5 FL — SIGNIFICANT CHANGE UP (ref 7–13)
POTASSIUM SERPL-MCNC: 3.1 MMOL/L — LOW (ref 3.5–5.3)
POTASSIUM SERPL-SCNC: 3.1 MMOL/L — LOW (ref 3.5–5.3)
RBC # BLD: 4.52 M/UL — SIGNIFICANT CHANGE UP (ref 4.2–5.8)
RBC # FLD: 12.9 % — SIGNIFICANT CHANGE UP (ref 10.3–14.5)
SODIUM SERPL-SCNC: 142 MMOL/L — SIGNIFICANT CHANGE UP (ref 135–145)
SPECIMEN SOURCE: SIGNIFICANT CHANGE UP
TSH SERPL-MCNC: 3.08 UIU/ML — SIGNIFICANT CHANGE UP (ref 0.27–4.2)
VIT B12 SERPL-MCNC: 1341 PG/ML — HIGH (ref 200–900)
WBC # BLD: 5.23 K/UL — SIGNIFICANT CHANGE UP (ref 3.8–10.5)
WBC # FLD AUTO: 5.23 K/UL — SIGNIFICANT CHANGE UP (ref 3.8–10.5)

## 2019-11-06 PROCEDURE — 99223 1ST HOSP IP/OBS HIGH 75: CPT

## 2019-11-06 PROCEDURE — 99233 SBSQ HOSP IP/OBS HIGH 50: CPT

## 2019-11-06 RX ORDER — POTASSIUM CHLORIDE 20 MEQ
40 PACKET (EA) ORAL ONCE
Refills: 0 | Status: COMPLETED | OUTPATIENT
Start: 2019-11-06 | End: 2019-11-06

## 2019-11-06 RX ADMIN — LOSARTAN POTASSIUM 100 MILLIGRAM(S): 100 TABLET, FILM COATED ORAL at 05:57

## 2019-11-06 RX ADMIN — Medication 1: at 13:19

## 2019-11-06 RX ADMIN — Medication 30 MILLIGRAM(S): at 05:56

## 2019-11-06 RX ADMIN — CARVEDILOL PHOSPHATE 12.5 MILLIGRAM(S): 80 CAPSULE, EXTENDED RELEASE ORAL at 05:56

## 2019-11-06 RX ADMIN — ATORVASTATIN CALCIUM 40 MILLIGRAM(S): 80 TABLET, FILM COATED ORAL at 21:49

## 2019-11-06 RX ADMIN — CARVEDILOL PHOSPHATE 12.5 MILLIGRAM(S): 80 CAPSULE, EXTENDED RELEASE ORAL at 17:30

## 2019-11-06 RX ADMIN — Medication 81 MILLIGRAM(S): at 13:18

## 2019-11-06 RX ADMIN — Medication 40 MILLIEQUIVALENT(S): at 13:18

## 2019-11-06 NOTE — CONSULT NOTE ADULT - ASSESSMENT
73 year old man PMH HTN, HLD, DM2, CAD (s/p stent), CVA (residual memory loss), presents with hypoglycemia due to inappropriate use of insulin at home. BG goal 100-200 mg/dL.

## 2019-11-06 NOTE — PHYSICAL THERAPY INITIAL EVALUATION ADULT - ADDITIONAL COMMENTS
Patient reports of living in a house with his daughter with 3 steps to enter and a flight of stairs inside. Patient reports of being independent in ambulation using a single axis cane and independent with some assistance by daughter with some ADLs.     Patient was left sitting in chair as found, DREAD SANTOS Patient reports of living in a house with his daughter with 3 steps to enter and a flight of stairs inside. Patient reports of being independent in ambulation using a single axis cane and independent with some assistance by daughter with some ADLs.     Patient was left sitting in chair as found, DREAD LOPEZ

## 2019-11-06 NOTE — CONSULT NOTE ADULT - SUBJECTIVE AND OBJECTIVE BOX
HPI:  Patient is a 73 year old man PMH HTN, HLD, DM2, CAD (s/p stent), CVA (residual memory loss), BIBEMS from day program, for hypoglycemia to 27 with associated confusion and sweating. Resolved after administration of glucagon and glucopaste. Consult called for management of DM2. Patient states that he was diagnosed with DM2 more than 40 years ago. Has never seen an endocrinologist. At bedside, he is AAO x 3. He states that he takes Basaglar and Humalog at home. Only takes two injections a day. Decides how much insulin to give himself based on his BG values. Admits to taking Basaglar 20-26 units every night (did take it on Monday night) and Humalog up to 20 units before or after breakfast. He was not aware that Humalog is an insulin that should be given before meals. He thinks he took both his Basaglar and his Novolog yesterday morning. He has not received any insulin since coming to [facility] but was nevertheless hypoglycemic on CMP this AM. Checks BG twice a day. AM 's to as high as 200. Bedtime BG ranges from 110 to 200. He did have one episode of bedtime hypoglycemia to the 50's. Per H&P, he has been having hypoglycemia more frequently recently. He does not have neuropathy in the feet. Has CKD stage 3a. Does not remember the last time he saw optho and had a dilated eye exam. He has blurry vision and polyuria, but no polydipsia. Eats usually twice a day at home. Drinks juice occasionally, no soda.    PAST MEDICAL & SURGICAL HISTORY:  HTN (hypertension)  DM (diabetes mellitus)  Myocardial infarction: 10 years ago, s/p stent x 1  Diabetes mellitus  Hypertension  CAD (coronary artery disease)      FAMILY HISTORY:  DM2 in mother, father, and siblings    Social History:  Former smoker, quit more than 35 years ago  No alcohol use  Lives with daughter    Outpatient Medications:  Basaglar  Humalog    MEDICATIONS  (STANDING):  aspirin  chewable 81 milliGRAM(s) Oral daily  atorvastatin 40 milliGRAM(s) Oral at bedtime  carvedilol 12.5 milliGRAM(s) Oral every 12 hours  dextrose 5%. 1000 milliLiter(s) (50 mL/Hr) IV Continuous <Continuous>  dextrose 50% Injectable 12.5 Gram(s) IV Push once  dextrose 50% Injectable 25 Gram(s) IV Push once  dextrose 50% Injectable 25 Gram(s) IV Push once  insulin lispro (HumaLOG) corrective regimen sliding scale   SubCutaneous three times a day before meals  insulin lispro (HumaLOG) corrective regimen sliding scale   SubCutaneous at bedtime  losartan 100 milliGRAM(s) Oral daily  NIFEdipine XL 30 milliGRAM(s) Oral daily  potassium chloride    Tablet ER 40 milliEquivalent(s) Oral once    MEDICATIONS  (PRN):  dextrose 40% Gel 15 Gram(s) Oral once PRN Blood Glucose LESS THAN 70 milliGRAM(s)/deciliter  glucagon  Injectable 1 milliGRAM(s) IntraMuscular once PRN Glucose LESS THAN 70 milligrams/deciliter      Allergies  No Known Allergies    Review of Systems:  Constitutional: No fever  Eyes: + blurry vision  Neuro: No tremors  HEENT: No pain  Cardiovascular: No chest pain, palpitations  Respiratory: No SOB, no cough  GI: No nausea, vomiting, abdominal pain  : No dysuria  Skin: no rash  Endocrine: + polyuria, no polydipsia    ALL OTHER SYSTEMS REVIEWED AND NEGATIVE    PHYSICAL EXAM:  VITALS: T(C): 36.3 (11-06-19 @ 05:31)  T(F): 97.3 (11-06-19 @ 05:31), Max: 98 (11-05-19 @ 11:39)  HR: 69 (11-06-19 @ 05:31) (69 - 94)  BP: 147/94 (11-06-19 @ 05:31) (139/81 - 161/89)  RR:  (16 - 18)  SpO2:  (98% - 100%)  Wt(kg): --  GENERAL: NAD, well-developed  EYES: No proptosis, anicteric  HEENT:  Atraumatic, Normocephalic  THYROID: Normal size, no palpable nodules  RESPIRATORY: Clear to auscultation bilaterally; No rales, rhonchi, wheezing  CARDIOVASCULAR: Regular rate and rhythm; No murmurs; no peripheral edema  GI: Soft, nontender, non distended, normal bowel sounds  SKIN: Dry, intact, No ulcers noted on feet  PSYCH: Alert and oriented x 3, reactive affect      POCT Blood Glucose.: 72 mg/dL (11-06-19 @ 08:51)  POCT Blood Glucose.: 123 mg/dL (11-05-19 @ 21:40)  POCT Blood Glucose.: 211 mg/dL (11-05-19 @ 17:55)  POCT Blood Glucose.: 247 mg/dL (11-05-19 @ 13:47)  POCT Blood Glucose.: 158 mg/dL (11-05-19 @ 11:48)                          12.2   x     )-----------( 259      ( 06 Nov 2019 06:45 )             39.1       11-06    142  |  102  |  24<H>  ----------------------------<  49<L>  3.1<L>   |  28  |  1.32<H>    EGFR if : 61  EGFR if non : 52    Ca    9.3      11-06  Mg     1.8     11-06  Phos  2.7     11-06    TPro  7.6  /  Alb  4.5  /  TBili  0.5  /  DBili  x   /  AST  14  /  ALT  11  /  AlkPhos  63  11-05      Thyroid Function Tests:  11-06 @ 06:45 TSH 3.08 FreeT4 -- T3 -- Anti TPO -- Anti Thyroglobulin Ab -- TSI --      Hemoglobin A1C, Whole Blood: 7.9 % <H> [4.0 - 5.6] (11-06-19 @ 06:45)

## 2019-11-06 NOTE — PROGRESS NOTE ADULT - PROBLEM SELECTOR PLAN 6
Resume home BP meds: nifedipine, losartan and coreg  Given med non-compliance, will hold hctz for now and resume hctz if BP can tolerate

## 2019-11-06 NOTE — PROGRESS NOTE ADULT - PROBLEM SELECTOR PLAN 2
Leukocytosis without fever or evidence of infection  Resolved- likely reactive   UA without evidence of UTI, CXR without pna. no acute symptoms   will continue to monitor off abx, if meets SIRS criteria consider starting abx and obtaining blood cx

## 2019-11-06 NOTE — CONSULT NOTE ADULT - ATTENDING COMMENTS
Jace Galindo MD   Pager # [phone]  On evenings and weekends, please call the office at [phone] or page endocrine fellow on call. Please note that this patient may be followed by different provider tomorrow. If no answer, contact the office.

## 2019-11-06 NOTE — PHYSICAL THERAPY INITIAL EVALUATION ADULT - PATIENT PROFILE REVIEW, REHAB EVAL
yes/PT orders received: Ambulate with assistance. Consult with DREAD SANTOS, pt may participate in PT evaluation. PT orders received: Ambulate with assistance. Consult with RN Galilea LOPEZ, pt may participate in PT evaluation./yes

## 2019-11-06 NOTE — PROGRESS NOTE ADULT - PROBLEM SELECTOR PLAN 1
Symptomatic hypoglycemia likely secondary to accidental extra dose of Insulin   BS in 158-   Standing insulin  on hold for now    on low correction scale qac and qhs   HbA1c 7.9%   As per pt, he gives himself Insulin based on his BS   Endo on board

## 2019-11-06 NOTE — CONSULT NOTE ADULT - PROBLEM SELECTOR RECOMMENDATION 9
- HbA1c 7.9% - given patient's age and co-morbidities, HbA1c at goal, however patient with hypoglycemia at home  - given hypoglycemia this AM on CMP, suspect patient did in fact double dose on Basaglar (likely took it on Monday night and Tuesday morning)  - would hold standing insulin for now and monitor on low correction scale qac and qhs  - check BG qac and qhs. -200 mg/dL  - consistent carb diet  - will follow  - can check c-peptide tomorrow AM to assess pancreatic reserve - if sufficient, can attempt to dc on oral agents only if feasible versus basal insulin only or basal +orals depending on insulin requirements  - he can follow up in the office if he wishes - [phone] - [phone] [facility]

## 2019-11-06 NOTE — PHYSICAL THERAPY INITIAL EVALUATION ADULT - PERTINENT HX OF CURRENT PROBLEM, REHAB EVAL
Patient is a 73 year old male admitted to [facility] on 11/05/19 for hypoglycemia. PMH of HTN, diabetes, myocardial infarction, CAD

## 2019-11-06 NOTE — PROGRESS NOTE ADULT - SUBJECTIVE AND OBJECTIVE BOX
PROGRESS NOTE:     CC: Patient is a 73y old  Male who presents with a chief complaint of hypoglycemic episode (2019 09:45)      SUBJECTIVE / OVERNIGHT EVENTS:    ADDITIONAL REVIEW OF SYSTEMS:    MEDICATIONS  (STANDING):  aspirin  chewable 81 milliGRAM(s) Oral daily  atorvastatin 40 milliGRAM(s) Oral at bedtime  carvedilol 12.5 milliGRAM(s) Oral every 12 hours  dextrose 5%. 1000 milliLiter(s) (50 mL/Hr) IV Continuous <Continuous>  dextrose 50% Injectable 12.5 Gram(s) IV Push once  dextrose 50% Injectable 25 Gram(s) IV Push once  dextrose 50% Injectable 25 Gram(s) IV Push once  insulin lispro (HumaLOG) corrective regimen sliding scale   SubCutaneous three times a day before meals  insulin lispro (HumaLOG) corrective regimen sliding scale   SubCutaneous at bedtime  losartan 100 milliGRAM(s) Oral daily  NIFEdipine XL 30 milliGRAM(s) Oral daily    MEDICATIONS  (PRN):  dextrose 40% Gel 15 Gram(s) Oral once PRN Blood Glucose LESS THAN 70 milliGRAM(s)/deciliter  glucagon  Injectable 1 milliGRAM(s) IntraMuscular once PRN Glucose LESS THAN 70 milligrams/deciliter      CAPILLARY BLOOD GLUCOSE      POCT Blood Glucose.: 158 mg/dL (2019 12:37)  POCT Blood Glucose.: 72 mg/dL (2019 08:51)  POCT Blood Glucose.: 123 mg/dL (2019 21:40)  POCT Blood Glucose.: 211 mg/dL (2019 17:55)    I&O's Summary      PHYSICAL EXAM:  Vital Signs Last 24 Hrs  T(C): 36.3 (2019 05:31), Max: 36.7 (2019 19:40)  T(F): 97.3 (2019 05:31), Max: 98 (2019 19:40)  HR: 69 (2019 05:31) (69 - 94)  BP: 147/94 (2019 05:31) (139/81 - 150/88)  BP(mean): --  RR: 16 (2019 05:31) (16 - 17)  SpO2: 99% (2019 05:31) (98% - 100%)    CONSTITUTIONAL: NAD, well-developed  RESPIRATORY: Normal respiratory effort; lungs are clear to auscultation bilaterally  CARDIOVASCULAR: Regular rate and rhythm, normal S1 and S2, no murmur/rub/gallop; No lower extremity edema; Peripheral pulses are 2+ bilaterally  ABDOMEN: Nontender to palpation, normoactive bowel sounds, no rebound/guarding; No hepatosplenomegaly  MUSCLOSKELETAL: no clubbing or cyanosis of digits; no joint swelling or tenderness to palpation  PSYCH: A+O to person, place, and time; affect appropriate  NEURO:  SKIN:    LABS:                        12.2   5.23  )-----------( 259      ( 2019 06:45 )             39.1     11-    142  |  102  |  24<H>  ----------------------------<  49<L>  3.1<L>   |  28  |  1.32<H>    Ca    9.3      2019 06:45  Phos  2.7       Mg     1.8         TPro  7.6  /  Alb  4.5  /  TBili  0.5  /  DBili  x   /  AST  14  /  ALT  11  /  AlkPhos  63  11-          Urinalysis Basic - ( 2019 12:20 )    Color: COLORLESS / Appearance: CLEAR / S.011 / pH: 7.0  Gluc: 200 / Ketone: NEGATIVE  / Bili: NEGATIVE / Urobili: NORMAL   Blood: NEGATIVE / Protein: 10 / Nitrite: NEGATIVE   Leuk Esterase: NEGATIVE / RBC: x / WBC x   Sq Epi: x / Non Sq Epi: x / Bacteria: x        Culture - Urine (collected 2019 12:40)  Source: URINE MIDSTREAM  Preliminary Report (2019 08:58):    NO GROWTH TO DATE        RADIOLOGY & ADDITIONAL TESTS:  Results Reviewed:   Imaging Personally Reviewed:  Electrocardiogram Personally Reviewed:    COORDINATION OF CARE:  Care Discussed with Consultants/Other Providers [Y/N]:  Prior or Outpatient Records Reviewed [Y/N]:

## 2019-11-06 NOTE — PROGRESS NOTE ADULT - PROBLEM SELECTOR PLAN 3
AAOx2 currently. no behavioral disturbance reported at home   B12, TSH - WNL   would benefit from outpt geriatric assessment, daughter wants to transition to new PMD within [facility] if possible (they live in [place]). also would benefit from use of pill box   PT eval - Home

## 2019-11-06 NOTE — PROGRESS NOTE ADULT - PROBLEM SELECTOR PLAN 10
Transitions of Care Status:  1.  Name of PCP: Dr. Patterson [phone]  2.  PCP Contacted on Admission: [x ] Y    [ ] N    3.  PCP contacted at Discharge: [ ] Y    [ ] N    [ ] N/A  4.  Post-Discharge Appointment Date and Location:  5.  Summary of Handoff given to PCP:

## 2019-11-07 LAB
ANION GAP SERPL CALC-SCNC: 12 MMO/L — SIGNIFICANT CHANGE UP (ref 7–14)
BACTERIA UR CULT: SIGNIFICANT CHANGE UP
BUN SERPL-MCNC: 23 MG/DL — SIGNIFICANT CHANGE UP (ref 7–23)
C PEPTIDE SERPL-MCNC: 1 NG/ML — LOW (ref 1.1–4.4)
CALCIUM SERPL-MCNC: 9.5 MG/DL — SIGNIFICANT CHANGE UP (ref 8.4–10.5)
CHLORIDE SERPL-SCNC: 102 MMOL/L — SIGNIFICANT CHANGE UP (ref 98–107)
CO2 SERPL-SCNC: 26 MMOL/L — SIGNIFICANT CHANGE UP (ref 22–31)
CREAT SERPL-MCNC: 1.22 MG/DL — SIGNIFICANT CHANGE UP (ref 0.5–1.3)
GLUCOSE BLDC GLUCOMTR-MCNC: 182 MG/DL — HIGH (ref 70–99)
GLUCOSE BLDC GLUCOMTR-MCNC: 225 MG/DL — HIGH (ref 70–99)
GLUCOSE BLDC GLUCOMTR-MCNC: 229 MG/DL — HIGH (ref 70–99)
GLUCOSE BLDC GLUCOMTR-MCNC: 234 MG/DL — HIGH (ref 70–99)
GLUCOSE SERPL-MCNC: 180 MG/DL — HIGH (ref 70–99)
HCT VFR BLD CALC: 43 % — SIGNIFICANT CHANGE UP (ref 39–50)
HGB BLD-MCNC: 12.9 G/DL — LOW (ref 13–17)
MAGNESIUM SERPL-MCNC: 1.8 MG/DL — SIGNIFICANT CHANGE UP (ref 1.6–2.6)
MCHC RBC-ENTMCNC: 26.3 PG — LOW (ref 27–34)
MCHC RBC-ENTMCNC: 30 % — LOW (ref 32–36)
MCV RBC AUTO: 87.6 FL — SIGNIFICANT CHANGE UP (ref 80–100)
NRBC # FLD: 0 K/UL — SIGNIFICANT CHANGE UP (ref 0–0)
PHOSPHATE SERPL-MCNC: 2.5 MG/DL — SIGNIFICANT CHANGE UP (ref 2.5–4.5)
PLATELET # BLD AUTO: 261 K/UL — SIGNIFICANT CHANGE UP (ref 150–400)
PMV BLD: 9.9 FL — SIGNIFICANT CHANGE UP (ref 7–13)
POTASSIUM SERPL-MCNC: 3.6 MMOL/L — SIGNIFICANT CHANGE UP (ref 3.5–5.3)
POTASSIUM SERPL-SCNC: 3.6 MMOL/L — SIGNIFICANT CHANGE UP (ref 3.5–5.3)
RBC # BLD: 4.91 M/UL — SIGNIFICANT CHANGE UP (ref 4.2–5.8)
RBC # FLD: 12.7 % — SIGNIFICANT CHANGE UP (ref 10.3–14.5)
SODIUM SERPL-SCNC: 140 MMOL/L — SIGNIFICANT CHANGE UP (ref 135–145)
WBC # BLD: 4.8 K/UL — SIGNIFICANT CHANGE UP (ref 3.8–10.5)
WBC # FLD AUTO: 4.8 K/UL — SIGNIFICANT CHANGE UP (ref 3.8–10.5)

## 2019-11-07 PROCEDURE — 99232 SBSQ HOSP IP/OBS MODERATE 35: CPT

## 2019-11-07 RX ORDER — SENNA PLUS 8.6 MG/1
2 TABLET ORAL AT BEDTIME
Refills: 0 | Status: DISCONTINUED | OUTPATIENT
Start: 2019-11-07 | End: 2019-11-08

## 2019-11-07 RX ORDER — INSULIN LISPRO 100/ML
2 VIAL (ML) SUBCUTANEOUS
Refills: 0 | Status: DISCONTINUED | OUTPATIENT
Start: 2019-11-07 | End: 2019-11-08

## 2019-11-07 RX ORDER — POLYETHYLENE GLYCOL 3350 17 G/17G
17 POWDER, FOR SOLUTION ORAL DAILY
Refills: 0 | Status: DISCONTINUED | OUTPATIENT
Start: 2019-11-07 | End: 2019-11-08

## 2019-11-07 RX ADMIN — LOSARTAN POTASSIUM 100 MILLIGRAM(S): 100 TABLET, FILM COATED ORAL at 06:44

## 2019-11-07 RX ADMIN — CARVEDILOL PHOSPHATE 12.5 MILLIGRAM(S): 80 CAPSULE, EXTENDED RELEASE ORAL at 17:13

## 2019-11-07 RX ADMIN — Medication 81 MILLIGRAM(S): at 11:41

## 2019-11-07 RX ADMIN — CARVEDILOL PHOSPHATE 12.5 MILLIGRAM(S): 80 CAPSULE, EXTENDED RELEASE ORAL at 06:44

## 2019-11-07 RX ADMIN — Medication 2: at 13:03

## 2019-11-07 RX ADMIN — Medication 2 UNIT(S): at 17:53

## 2019-11-07 RX ADMIN — Medication 2: at 17:53

## 2019-11-07 RX ADMIN — Medication 1: at 09:03

## 2019-11-07 RX ADMIN — Medication 30 MILLIGRAM(S): at 06:44

## 2019-11-07 NOTE — PROGRESS NOTE ADULT - PROBLEM SELECTOR PLAN 1
Symptomatic hypoglycemia likely secondary to accidental extra dose of Insulin   BS in 158-   Standing insulin  on hold for now    on low correction scale qac and qhs   HbA1c 7.9%   As per pt, he gives himself Insulin based on his BS   Endo on board-Advise to  check c-peptide  to assess pancreatic reserve - if sufficient, can attempt to dc on oral agents only if feasible versus basal insulin only or basal +orals depending on insulin requirements.   FU Cpeptide   FU Endo Recs

## 2019-11-07 NOTE — PROGRESS NOTE ADULT - SUBJECTIVE AND OBJECTIVE BOX
PROGRESS NOTE:     CC: Patient is a 73y old  Male who presents with a chief complaint of hypoglycemic episode (2019 09:45)      SUBJECTIVE / OVERNIGHT EVENTS:  Pt seen and examined by me   Pt reports he is forgetful and doesnt remember details     ADDITIONAL REVIEW OF SYSTEMS:    MEDICATIONS  (STANDING):  MEDICATIONS  (STANDING):  aspirin  chewable 81 milliGRAM(s) Oral daily  atorvastatin 40 milliGRAM(s) Oral at bedtime  carvedilol 12.5 milliGRAM(s) Oral every 12 hours  dextrose 5%. 1000 milliLiter(s) (50 mL/Hr) IV Continuous <Continuous>  dextrose 50% Injectable 12.5 Gram(s) IV Push once  dextrose 50% Injectable 25 Gram(s) IV Push once  dextrose 50% Injectable 25 Gram(s) IV Push once  insulin lispro (HumaLOG) corrective regimen sliding scale   SubCutaneous three times a day before meals  insulin lispro (HumaLOG) corrective regimen sliding scale   SubCutaneous at bedtime  losartan 100 milliGRAM(s) Oral daily  NIFEdipine XL 30 milliGRAM(s) Oral daily    MEDICATIONS  (PRN):  dextrose 40% Gel 15 Gram(s) Oral once PRN Blood Glucose LESS THAN 70 milliGRAM(s)/deciliter  glucagon  Injectable 1 milliGRAM(s) IntraMuscular once PRN Glucose LESS THAN 70 milligrams/deciliter      CAPILLARY BLOOD GLUCOSE  POCT Blood Glucose.: 182 mg/dL (2019 08:55)  POCT Blood Glucose.: 233 mg/dL (2019 21:58)  POCT Blood Glucose.: 139 mg/dL (2019 17:24)  POCT Blood Glucose.: 158 mg/dL (2019 12:37)      PHYSICAL EXAM:  Vital Signs Last 24 Hrs  T(C): 36.7 (2019 07:55), Max: 37.1 (2019 14:53)  T(F): 98.1 (2019 07:55), Max: 98.8 (2019 14:53)  HR: 68 (2019 07:55) (65 - 75)  BP: 148/82 (2019 07:55) (138/80 - 148/82)  BP(mean): --  RR: 17 (2019 07:55) (16 - 18)  SpO2: 99% (2019 07:55) (98% - 99%)    CONSTITUTIONAL: NAD, well-developed  RESPIRATORY: Normal respiratory effort; lungs are clear to auscultation bilaterally  CARDIOVASCULAR: Regular rate and rhythm, normal S1 and S2, no murmur/rub/gallop; No lower extremity edema; Peripheral pulses are 2+ bilaterally  ABDOMEN: Nontender to palpation, normoactive bowel sounds, no rebound/guarding; No hepatosplenomegaly  MUSCLOSKELETAL: no clubbing or cyanosis of digits; no joint swelling or tenderness to palpation  PSYCH: A+O to person, place, and time; affect appropriate  NEURO:  SKIN:    LABS:                                        12.9   4.80  )-----------( 261      ( 2019 07:00 )             43.0   11-    140  |  102  |  23  ----------------------------<  180<H>  3.6   |  26  |  1.22    Ca    9.5      2019 07:00  Phos  2.5     -  Mg     1.8         TPro  7.6  /  Alb  4.5  /  TBili  0.5  /  DBili  x   /  AST  14  /  ALT  11  /  AlkPhos  63  11-05            Urinalysis Basic - ( 2019 12:20 )    Color: COLORLESS / Appearance: CLEAR / S.011 / pH: 7.0  Gluc: 200 / Ketone: NEGATIVE  / Bili: NEGATIVE / Urobili: NORMAL   Blood: NEGATIVE / Protein: 10 / Nitrite: NEGATIVE   Leuk Esterase: NEGATIVE / RBC: x / WBC x   Sq Epi: x / Non Sq Epi: x / Bacteria: x        Culture - Urine (collected 2019 12:40)  Source: URINE MIDSTREAM  Preliminary Report (2019 08:58):    NO GROWTH TO DATE        RADIOLOGY & ADDITIONAL TESTS:  Results Reviewed:   Imaging Personally Reviewed:  Electrocardiogram Personally Reviewed:    COORDINATION OF CARE:  Care Discussed with Consultants/Other Providers [Y/N]:  Prior or Outpatient Records Reviewed [Y/N]:

## 2019-11-07 NOTE — PROGRESS NOTE ADULT - ASSESSMENT
73 year old man PMH HTN, HLD, DM2, CAD (s/p stent), CVA (residual memory loss), presents with hypoglycemia due to inappropriate use of insulin at home. BG goal 100-200 mg/dL.     T2dm with hypoglycemia  - on basal bolus at home.    - target bg 100-200 mg/dl  - would start humalog 2 units sq tid ac  - c/w humalog low correction scale ac/hs  - fasting FS in target range so no need for basal insulin for now  - follow up c-peptide      dc plan- TBD- may need someone at home to help with diabetes management given memory concerns..  Lives with daughter.  Can decide dc plan once s-peptide is resulted.

## 2019-11-07 NOTE — PROGRESS NOTE ADULT - PROBLEM SELECTOR PLAN 9
-dvt ppx: improve score 1 (age)- monitor off dvt ppx  -diet DASH/cc   Called pts daughter [place]clifton- left message

## 2019-11-07 NOTE — PROGRESS NOTE ADULT - SUBJECTIVE AND OBJECTIVE BOX
Chief Complaint: t2dm with hypoglycemia    History: pt is poor historian and unable to give a good history.  reports good appetite.  no hypoglycemia s/s at time of visit    MEDICATIONS  (STANDING):  aspirin  chewable 81 milliGRAM(s) Oral daily  atorvastatin 40 milliGRAM(s) Oral at bedtime  carvedilol 12.5 milliGRAM(s) Oral every 12 hours  dextrose 5%. 1000 milliLiter(s) (50 mL/Hr) IV Continuous <Continuous>  dextrose 50% Injectable 12.5 Gram(s) IV Push once  dextrose 50% Injectable 25 Gram(s) IV Push once  dextrose 50% Injectable 25 Gram(s) IV Push once  insulin lispro (HumaLOG) corrective regimen sliding scale   SubCutaneous three times a day before meals  insulin lispro (HumaLOG) corrective regimen sliding scale   SubCutaneous at bedtime  insulin lispro Injectable (HumaLOG) 2 Unit(s) SubCutaneous three times a day before meals  losartan 100 milliGRAM(s) Oral daily  NIFEdipine XL 30 milliGRAM(s) Oral daily  polyethylene glycol 3350 17 Gram(s) Oral daily  senna 2 Tablet(s) Oral at bedtime    MEDICATIONS  (PRN):  dextrose 40% Gel 15 Gram(s) Oral once PRN Blood Glucose LESS THAN 70 milliGRAM(s)/deciliter  glucagon  Injectable 1 milliGRAM(s) IntraMuscular once PRN Glucose LESS THAN 70 milligrams/deciliter      Allergies    No Known Allergies    Intolerances      Review of Systems:  Constitutional: No fever    ALL OTHER SYSTEMS REVIEWED AND NEGATIVE      PHYSICAL EXAM:  VITALS: T(C): 36.7 (11-07-19 @ 13:41)  T(F): 98 (11-07-19 @ 13:41), Max: 98.6 (11-06-19 @ 20:15)  HR: 70 (11-07-19 @ 13:41) (65 - 73)  BP: 145/79 (11-07-19 @ 13:41) (138/80 - 148/82)  RR:  (16 - 18)  SpO2:  (98% - 99%)  Wt(kg): --  GENERAL: NAD, well-groomed, well-developed  GI: Soft, nontender, non distended  SKIN: Dry, intact, No rashes or lesions  PSYCH: Alert and oriented x 3, normal affect, normal mood      CAPILLARY BLOOD GLUCOSE      POCT Blood Glucose.: 229 mg/dL (07 Nov 2019 12:46)  POCT Blood Glucose.: 182 mg/dL (07 Nov 2019 08:55)  POCT Blood Glucose.: 233 mg/dL (06 Nov 2019 21:58)  POCT Blood Glucose.: 139 mg/dL (06 Nov 2019 17:24)      11-07    140  |  102  |  23  ----------------------------<  180<H>  3.6   |  26  |  1.22    EGFR if : 67  EGFR if non : 58    Ca    9.5      11-07  Mg     1.8     11-07  Phos  2.5     11-07    TPro  7.6  /  Alb  4.5  /  TBili  0.5  /  DBili  x   /  AST  14  /  ALT  11  /  AlkPhos  63  11-05          Thyroid Function Tests:  11-06 @ 06:45 TSH 3.08 FreeT4 -- T3 -- Anti TPO -- Anti Thyroglobulin Ab -- TSI --      Hemoglobin A1C, Whole Blood: 7.9 % <H> [4.0 - 5.6] (11-06-19 @ 06:45)

## 2019-11-07 NOTE — PROGRESS NOTE ADULT - PROBLEM SELECTOR PLAN 10
Transitions of Care Status:  1.  Name of PCP: Dr. Patterson [phone]  2.  PCP Contacted on Admission: [x ] Y    [ ] N    3.  PCP contacted at Discharge: [ ] Y    [ ] N    [ ] N/A  4.  Post-Discharge Appointment Date and Location:  5.  Summary of Handoff given to PCP:

## 2019-11-07 NOTE — PROGRESS NOTE ADULT - PROBLEM SELECTOR PLAN 3
AAOx2 currently. no behavioral disturbance reported at home   B12, TSH - WNL   would benefit from outpt geriatric assessment, daughter wants to transition to new PMD within [facility] if possible (they live in [place])  PT eval - Home

## 2019-11-08 ENCOUNTER — TRANSCRIPTION ENCOUNTER (OUTPATIENT)
Age: 74
End: 2019-11-08

## 2019-11-08 VITALS
RESPIRATION RATE: 18 BRPM | HEART RATE: 75 BPM | SYSTOLIC BLOOD PRESSURE: 134 MMHG | TEMPERATURE: 98 F | OXYGEN SATURATION: 100 % | DIASTOLIC BLOOD PRESSURE: 79 MMHG

## 2019-11-08 LAB
ANION GAP SERPL CALC-SCNC: 12 MMO/L — SIGNIFICANT CHANGE UP (ref 7–14)
BASOPHILS # BLD AUTO: 0.04 K/UL — SIGNIFICANT CHANGE UP (ref 0–0.2)
BASOPHILS NFR BLD AUTO: 0.8 % — SIGNIFICANT CHANGE UP (ref 0–2)
BUN SERPL-MCNC: 23 MG/DL — SIGNIFICANT CHANGE UP (ref 7–23)
CALCIUM SERPL-MCNC: 9.2 MG/DL — SIGNIFICANT CHANGE UP (ref 8.4–10.5)
CHLORIDE SERPL-SCNC: 100 MMOL/L — SIGNIFICANT CHANGE UP (ref 98–107)
CO2 SERPL-SCNC: 24 MMOL/L — SIGNIFICANT CHANGE UP (ref 22–31)
CREAT SERPL-MCNC: 1.18 MG/DL — SIGNIFICANT CHANGE UP (ref 0.5–1.3)
EOSINOPHIL # BLD AUTO: 0.19 K/UL — SIGNIFICANT CHANGE UP (ref 0–0.5)
EOSINOPHIL NFR BLD AUTO: 3.7 % — SIGNIFICANT CHANGE UP (ref 0–6)
GLUCOSE BLDC GLUCOMTR-MCNC: 221 MG/DL — HIGH (ref 70–99)
GLUCOSE BLDC GLUCOMTR-MCNC: 239 MG/DL — HIGH (ref 70–99)
GLUCOSE SERPL-MCNC: 231 MG/DL — HIGH (ref 70–99)
HCT VFR BLD CALC: 41.9 % — SIGNIFICANT CHANGE UP (ref 39–50)
HGB BLD-MCNC: 13.1 G/DL — SIGNIFICANT CHANGE UP (ref 13–17)
IMM GRANULOCYTES NFR BLD AUTO: 0.6 % — SIGNIFICANT CHANGE UP (ref 0–1.5)
LYMPHOCYTES # BLD AUTO: 1.15 K/UL — SIGNIFICANT CHANGE UP (ref 1–3.3)
LYMPHOCYTES # BLD AUTO: 22.4 % — SIGNIFICANT CHANGE UP (ref 13–44)
MAGNESIUM SERPL-MCNC: 1.9 MG/DL — SIGNIFICANT CHANGE UP (ref 1.6–2.6)
MCHC RBC-ENTMCNC: 26.7 PG — LOW (ref 27–34)
MCHC RBC-ENTMCNC: 31.3 % — LOW (ref 32–36)
MCV RBC AUTO: 85.3 FL — SIGNIFICANT CHANGE UP (ref 80–100)
MONOCYTES # BLD AUTO: 0.41 K/UL — SIGNIFICANT CHANGE UP (ref 0–0.9)
MONOCYTES NFR BLD AUTO: 8 % — SIGNIFICANT CHANGE UP (ref 2–14)
NEUTROPHILS # BLD AUTO: 3.31 K/UL — SIGNIFICANT CHANGE UP (ref 1.8–7.4)
NEUTROPHILS NFR BLD AUTO: 64.5 % — SIGNIFICANT CHANGE UP (ref 43–77)
NRBC # FLD: 0 K/UL — SIGNIFICANT CHANGE UP (ref 0–0)
PLATELET # BLD AUTO: 251 K/UL — SIGNIFICANT CHANGE UP (ref 150–400)
PMV BLD: 9.8 FL — SIGNIFICANT CHANGE UP (ref 7–13)
POTASSIUM SERPL-MCNC: 3.4 MMOL/L — LOW (ref 3.5–5.3)
POTASSIUM SERPL-SCNC: 3.4 MMOL/L — LOW (ref 3.5–5.3)
RBC # BLD: 4.91 M/UL — SIGNIFICANT CHANGE UP (ref 4.2–5.8)
RBC # FLD: 12.6 % — SIGNIFICANT CHANGE UP (ref 10.3–14.5)
SODIUM SERPL-SCNC: 136 MMOL/L — SIGNIFICANT CHANGE UP (ref 135–145)
WBC # BLD: 5.13 K/UL — SIGNIFICANT CHANGE UP (ref 3.8–10.5)
WBC # FLD AUTO: 5.13 K/UL — SIGNIFICANT CHANGE UP (ref 3.8–10.5)

## 2019-11-08 PROCEDURE — 99239 HOSP IP/OBS DSCHRG MGMT >30: CPT

## 2019-11-08 PROCEDURE — 99232 SBSQ HOSP IP/OBS MODERATE 35: CPT

## 2019-11-08 RX ORDER — METFORMIN HYDROCHLORIDE 850 MG/1
1 TABLET ORAL
Qty: 14 | Refills: 0
Start: 2019-11-08 | End: 2019-11-14

## 2019-11-08 RX ORDER — INSULIN GLARGINE 100 [IU]/ML
8 INJECTION, SOLUTION SUBCUTANEOUS
Qty: 1 | Refills: 0
Start: 2019-11-08 | End: 2019-12-07

## 2019-11-08 RX ORDER — INSULIN GLARGINE 100 [IU]/ML
5 INJECTION, SOLUTION SUBCUTANEOUS AT BEDTIME
Refills: 0 | Status: DISCONTINUED | OUTPATIENT
Start: 2019-11-08 | End: 2019-11-08

## 2019-11-08 RX ORDER — CARVEDILOL PHOSPHATE 80 MG/1
1 CAPSULE, EXTENDED RELEASE ORAL
Qty: 0 | Refills: 0 | DISCHARGE

## 2019-11-08 RX ORDER — INSULIN GLARGINE 100 [IU]/ML
25 INJECTION, SOLUTION SUBCUTANEOUS
Qty: 0 | Refills: 0 | DISCHARGE

## 2019-11-08 RX ORDER — INSULIN GLARGINE 100 [IU]/ML
10 INJECTION, SOLUTION SUBCUTANEOUS
Qty: 0 | Refills: 0 | DISCHARGE
Start: 2019-11-08 | End: 2019-12-07

## 2019-11-08 RX ORDER — CHLORTHALIDONE 50 MG
1 TABLET ORAL
Qty: 0 | Refills: 0 | DISCHARGE

## 2019-11-08 RX ORDER — INSULIN ASPART 100 [IU]/ML
10 INJECTION, SUSPENSION SUBCUTANEOUS
Qty: 0 | Refills: 0 | DISCHARGE

## 2019-11-08 RX ORDER — CARVEDILOL PHOSPHATE 80 MG/1
1 CAPSULE, EXTENDED RELEASE ORAL
Qty: 60 | Refills: 0
Start: 2019-11-08 | End: 2019-12-07

## 2019-11-08 RX ORDER — METFORMIN HYDROCHLORIDE 850 MG/1
2 TABLET ORAL
Qty: 120 | Refills: 0
Start: 2019-11-08 | End: 2019-12-07

## 2019-11-08 RX ORDER — POTASSIUM CHLORIDE 20 MEQ
40 PACKET (EA) ORAL ONCE
Refills: 0 | Status: COMPLETED | OUTPATIENT
Start: 2019-11-08 | End: 2019-11-08

## 2019-11-08 RX ADMIN — POLYETHYLENE GLYCOL 3350 17 GRAM(S): 17 POWDER, FOR SOLUTION ORAL at 12:45

## 2019-11-08 RX ADMIN — Medication 81 MILLIGRAM(S): at 12:45

## 2019-11-08 RX ADMIN — Medication 2 UNIT(S): at 09:19

## 2019-11-08 RX ADMIN — Medication 2: at 09:19

## 2019-11-08 RX ADMIN — Medication 2: at 12:48

## 2019-11-08 RX ADMIN — Medication 40 MILLIEQUIVALENT(S): at 12:47

## 2019-11-08 RX ADMIN — Medication 2 UNIT(S): at 12:48

## 2019-11-08 RX ADMIN — Medication 30 MILLIGRAM(S): at 05:22

## 2019-11-08 RX ADMIN — CARVEDILOL PHOSPHATE 12.5 MILLIGRAM(S): 80 CAPSULE, EXTENDED RELEASE ORAL at 05:22

## 2019-11-08 RX ADMIN — LOSARTAN POTASSIUM 100 MILLIGRAM(S): 100 TABLET, FILM COATED ORAL at 05:22

## 2019-11-08 NOTE — DISCHARGE NOTE NURSING/CASE MANAGEMENT/SOCIAL WORK - PATIENT PORTAL LINK FT
You can access the FollowMyHealth Patient Portal offered by [facility] by registering at the following website: [email or web address]. By joining [facility]’s FollowMyHealth portal, you will also be able to view your health information using other applications (apps) compatible with our system.

## 2019-11-08 NOTE — PROGRESS NOTE ADULT - PROBLEM SELECTOR PLAN 3
AAOx2 currently. no behavioral disturbance reported at home   B12, TSH - WNL   would benefit from outpt geriatric assessment, daughter wants to transition to new PMD within [facility] if possible (they live in [place])  PT eval - Home

## 2019-11-08 NOTE — PROGRESS NOTE ADULT - ASSESSMENT
73 year old man PMH HTN, HLD, DM2, CAD (s/p stent), CVA (residual memory loss), presents with hypoglycemia due to inappropriate use of insulin at home. BG goal 100-200 mg/dL.     T2dm with hypoglycemia  - on basal bolus at home.    - target bg 100-200 mg/dl  - would c/w humalog 2 units sq tid ac  - restart basal-lantus 5 units sq qhs  - c/w humalog low correction scale ac/hs  - fasting FS in target range so no need for basal insulin for now  - follow up c-peptide      dc plan- KRISTI valles is coming to pick patietn up and will learn how to do insulin injections.  pt can follow up with endcorinology [phone] 73 year old man PMH HTN, HLD, DM2, CAD (s/p stent), CVA (residual memory loss), presents with hypoglycemia due to inappropriate use of insulin at home. BG goal 100-200 mg/dL.     T2dm with hypoglycemia  - on basal bolus at home.    - target bg 100-200 mg/dl  - would c/w humalog 2 units sq tid ac  - restart basal-lantus 5 units sq qhs  - c/w humalog low correction scale ac/hs  - fasting FS in target range so no need for basal insulin for now  - c-peptide is 1.0- borderline low.  would need basal insulin for dc    dc plan- daughter is coming to pick patient up and will learn how to do insulin injections.  pt can follow up with endocrinology office- can call [phone]  to schedule appts.      Please send on basaglar kwikpen 8 units sq qhs  and metformin 500 mg po bid x 7 days then increase to 1000mg po BID

## 2019-11-08 NOTE — PROGRESS NOTE ADULT - PROBLEM SELECTOR PLAN 10
Transitions of Care Status:  1.  Name of PCP: Dr. Patterson [phone]  2.  PCP Contacted on Admission: [x ] Y    [ ] N    3.  PCP contacted at Discharge: [ ] Y    [ ] N    [ ] N/A  4.  Post-Discharge Appointment Date and Location:  5.  Summary of Handoff given to PCP: Transitions of Care Status:  1.  Name of PCP: Dr. Patterson [phone]  2.  PCP Contacted on Admission: [x ] Y    [ ] N    3.  PCP contacted at Discharge: [ ] Y    [ ] N    [ ] N/A - Attempted to call  4.  Post-Discharge Appointment Date and Location:  5.  Summary of Handoff given to PCP: Transitions of Care Status:  1.  Name of PCP: Dr. Patterson [phone]  2.  PCP Contacted on Admission: [x ] Y    [ ] N    3.  PCP contacted at Discharge: [ X] Y    [ ] N    [ ] N/A - Left VM  4.  Post-Discharge Appointment Date and Location:  5.  Summary of Handoff given to PCP:

## 2019-11-08 NOTE — PROGRESS NOTE ADULT - PROBLEM SELECTOR PLAN 1
Symptomatic hypoglycemia likely secondary to accidental extra dose of Insulin   BS in 158-. HbA1c 7.9%    Standing insulin  on hold for now   On Humalog 2 units sq tid ac  - c/w humalog low correction scale ac/hs  As per pt, he gives himself Insulin based on his BS. Pt not reliable in self administering Insulin without supervision at this time.Called daughter - left DEISY. CM also called daughter Jo, was referred to other daughter who did not answer  Endo on board-Advise to  check c-peptide  to assess pancreatic reserve - if sufficient, can attempt to dc on oral agents only if feasible versus basal insulin only or basal +orals depending on insulin requirements.   FU Cpeptide   FU final Endo Recs re dc Insulin Metabolic encephalopathy secondary to Symptomatic hypoglycemia likely secondary to accidental extra dose of Insulin   BS in 158-. HbA1c 7.9%    Standing insulin  on hold for now   On Humalog 2 units sq tid ac  - c/w humalog low correction scale ac/hs  As per pt, he gives himself Insulin based on his BS. Pt not reliable in self administering Insulin without supervision at this time.Called daughter - left DEISY. CM also called daughter Jo, was referred to other daughter who did not answer  Endo on board-Advise to  check c-peptide  to assess pancreatic reserve - if sufficient, can attempt to dc on oral agents only if feasible versus basal insulin only or basal +orals depending on insulin requirements.   FU Cpeptide   FU final Endo Recs re dc Insulin

## 2019-11-08 NOTE — DISCHARGE NOTE PROVIDER - NSDCMRMEDTOKEN_GEN_ALL_CORE_FT
Aspirin Enteric Coated 81 mg oral delayed release tablet: 1 tab(s) orally once a day  atorvastatin 40 mg oral tablet: 1 tab(s) orally once a day  Basaglar KwikPen 100 units/mL subcutaneous solution: 25 unit(s) subcutaneous once a day (at bedtime)  bisacodyl 5 mg oral delayed release tablet: 1 tab(s) orally once a day, As Needed  chlorthalidone 25 mg oral tablet: 1 tab(s) orally once a day  Coreg 12.5 mg oral tablet: 1 tab(s) orally 2 times a day    *as per pharmacy ([facility], last filled 6/28/2019 with no refills remaining   docusate sodium 100 mg oral tablet: 1 tab(s) orally once a day  losartan 100 mg oral tablet: 1 tab(s) orally once a day  MiraLax oral powder for reconstitution: 17 gram(s) orally once a day, As Needed  NIFEdipine 30 mg oral tablet, extended release: 1 tab(s) orally once a day  NovoLOG Mix 70/30 subcutaneous suspension: 10 unit(s) subcutaneous 2 times a day (with breakfast and dinner) **per pharmacy Aspirin Enteric Coated 81 mg oral delayed release tablet: 1 tab(s) orally once a day  atorvastatin 40 mg oral tablet: 1 tab(s) orally once a day  Basaglar KwikPen 100 units/mL subcutaneous solution: 8 unit(s) subcutaneous once a day (at bedtime)  at bedtime   bisacodyl 5 mg oral delayed release tablet: 1 tab(s) orally once a day, As Needed  carvedilol 12.5 mg oral tablet: 1 tab(s) orally every 12 hours  docusate sodium 100 mg oral tablet: 1 tab(s) orally once a day  losartan 100 mg oral tablet: 1 tab(s) orally once a day  metFORMIN 500 mg oral tablet: 1 tab(s) orally 2 times a day (with meals) For 1 week  metFORMIN 500 mg oral tablet: 2 tab(s) orally 2 times a day (with meals)   MiraLax oral powder for reconstitution: 17 gram(s) orally once a day, As Needed  NIFEdipine 30 mg oral tablet, extended release: 1 tab(s) orally once a day

## 2019-11-08 NOTE — DISCHARGE NOTE PROVIDER - HOSPITAL COURSE
73M h/o HTN, T2DM, CAD (s/p stent), CVA (residual memory loss, AAOx2), BIBEMS from day program, for hypoglycemia to 27 with associated confusion and sweating. Resolved after administration of glucagon and glucopaste, likely 2/2 med non-compliance in setting of worsening dementia, a/f diabetic management and SW eval for possible NH placement:                      Problem/Plan - 1:    ·  Problem: Type 2 diabetes mellitus with hypoglycemia without coma, with long-term current use of insulin.  Plan: Metabolic encephalopathy secondary to Symptomatic hypoglycemia likely secondary to accidental extra dose of Insulin     BS in 158-. HbA1c 7.9%      Standing insulin  on hold for now     On Humalog 2 units sq tid ac    - c/w humalog low correction scale ac/hs    As per pt, he gives himself Insulin based on his BS. Pt not reliable in self administering Insulin without supervision at this time.Called daughter - left DEISY. CM also called daughter Jo, was referred to other daughter who did not answer    Endo on board-Advise to  check c-peptide  to assess pancreatic reserve - if sufficient, can attempt to dc on oral agents only if feasible versus basal insulin only or basal +orals depending on insulin requirements.     FU Cpeptide     FU final Endo Recs re dc Insulin.          Problem/Plan - 2:    ·  Problem: Leukocytosis, unspecified type.  Plan: Leukocytosis without fever or evidence of infection    Resolved- likely reactive     UA without evidence of UTI, CXR without pna. no acute symptoms     will continue to monitor off abx, if meets SIRS criteria consider starting abx and obtaining blood cx.          Problem/Plan - 3:    ·  Problem: Dementia without behavioral disturbance, unspecified dementia type.  Plan: AAOx2 currently. no behavioral disturbance reported at home     B12, TSH - WNL     would benefit from outpt geriatric assessment, daughter wants to transition to new PMD within [facility] if possible (they live in [place])    PT eval - Home.          Problem/Plan - 4:    ·  Problem: Hypokalemia.  Plan: Replete.          Problem/Plan - 5:    ·  Problem: CKD (chronic kidney disease) stage 3, GFR 30-59 ml/min.  Plan: -renally dose meds and monitor Cr     Monitor, improving.          Problem/Plan - 6:    Problem: Essential hypertension. Plan: Resume home BP meds: nifedipine, losartan and coreg    Given med non-compliance, will hold hctz for now and resume hctz if BP can tolerate.         Problem/Plan - 7:    ·  Problem: Coronary artery disease without angina pectoris, unspecified vessel or lesion type, unspecified whether native or transplanted heart.  Plan: -stable, c/w ASA, statin, coreg.          Problem/Plan - 8:    ·  Problem: Cerebrovascular accident (CVA), unspecified mechanism.  Plan: stable, c/w ASA, statin.          Problem/Plan - 9:    ·  Problem: Need for prophylactic measure.  Plan: -dvt ppx: improve score 1 (age)- monitor off dvt ppx    -diet DASH/cc     Called pts daughter Nova- left message, Family will need to come in for Insulin teaching . CM was able to speak with daughter who will be coming in to learn Insulin teaching     Dispo-FU final Endo rec re- dc regimen    DC planning 31mins. 73M h/o HTN, T2DM, CAD (s/p stent), CVA (residual memory loss, AAOx2), BIBEMS from day program, for hypoglycemia to 27 with associated confusion and sweating. Resolved after administration of glucagon and glucopaste, likely 2/2 med non-compliance in setting of worsening dementia, a/f diabetic management and SW eval for possible NH placement:             ·  Problem: Type 2 diabetes mellitus with hypoglycemia without coma, with long-term current use of insulin.  Plan: Metabolic encephalopathy secondary to Symptomatic hypoglycemia likely secondary to accidental extra dose of Insulin     BS in 158-. HbA1c 7.9%      Standing insulin  on hold for now     As per pt, he gives himself Insulin based on his BS. Pt not reliable in self administering Insulin without supervision at this time.    Endo on board-Advise to discharge patient on lantus 8 units at bedtime with 500mg of metformin twice daily for 7 days and then increase metformin to 1g BID with follow up with endocrine.              ·  Problem: Leukocytosis, unspecified type.  Plan: Leukocytosis without fever or evidence of infection    Resolved- likely reactive     UA without evidence of UTI, CXR without pna. no acute symptoms     will continue to monitor off abx, patient does not meet SIRS criteria and remains afebrile.              ·  Problem: Dementia without behavioral disturbance, unspecified dementia type.  Plan: AAOx2 currently. no behavioral disturbance reported at home     B12, TSH - WNL     would benefit from outpt geriatric assessment, daughter wants to transition to new PMD within [facility] if possible (they live in [place])    PT eval - Home.             ·  Problem: Hypokalemia.  Plan: Repleted potassium            ·  Problem: CKD (chronic kidney disease) stage 3, GFR 30-59 ml/min.  Plan: -renally dose meds and monitor Cr     Monitor, improving. Chlorthiadone on hold while in patient due to BP within Patient will have acceptable levels of pain based on patients preferred pain scale. levels.  Patient can follow up with PCP regarding when to restart            Problem: Essential hypertension. Plan: Resume home BP meds: nifedipine, losartan and coreg    Given med non-compliance, will hold hctz for now and resume hctz if BP can tolerate.            ·  Problem: Coronary artery disease without angina pectoris, unspecified vessel or lesion type, unspecified whether native or transplanted heart.  Plan: -stable, c/w ASA, statin, coreg.         Daughter Jo present for insulin teaching with RN.  Discussed with attending, patient stable for discharge

## 2019-11-08 NOTE — PROGRESS NOTE ADULT - SUBJECTIVE AND OBJECTIVE BOX
PROGRESS NOTE:     CC: Patient is a 73y old  Male who presents with a chief complaint of hypoglycemic episode (2019 09:45)      SUBJECTIVE / OVERNIGHT EVENTS:  Pt seen and examined by me   Pt reports he is forgetful and doesnt remember details   DW RN- Pt was insisting on giving him higher doses of insulin as he feels he needs more    ADDITIONAL REVIEW OF SYSTEMS:    MEDICATIONS  (STANDING):  aspirin  chewable 81 milliGRAM(s) Oral daily  atorvastatin 40 milliGRAM(s) Oral at bedtime  carvedilol 12.5 milliGRAM(s) Oral every 12 hours  dextrose 5%. 1000 milliLiter(s) (50 mL/Hr) IV Continuous <Continuous>  dextrose 50% Injectable 12.5 Gram(s) IV Push once  dextrose 50% Injectable 25 Gram(s) IV Push once  dextrose 50% Injectable 25 Gram(s) IV Push once  insulin lispro (HumaLOG) corrective regimen sliding scale   SubCutaneous three times a day before meals  insulin lispro (HumaLOG) corrective regimen sliding scale   SubCutaneous at bedtime  insulin lispro Injectable (HumaLOG) 2 Unit(s) SubCutaneous three times a day before meals  losartan 100 milliGRAM(s) Oral daily  NIFEdipine XL 30 milliGRAM(s) Oral daily  polyethylene glycol 3350 17 Gram(s) Oral daily  potassium chloride    Tablet ER 40 milliEquivalent(s) Oral once  senna 2 Tablet(s) Oral at bedtime  MEDICATIONS  (PRN):  dextrose 40% Gel 15 Gram(s) Oral once PRN Blood Glucose LESS THAN 70 milliGRAM(s)/deciliter  glucagon  Injectable 1 milliGRAM(s) IntraMuscular once PRN Glucose LESS THAN 70 milligrams/deciliter      CAPILLARY BLOOD GLUCOSE  POCT Blood Glucose.: 221 mg/dL (2019 08:52)  POCT Blood Glucose.: 234 mg/dL (2019 21:54)  POCT Blood Glucose.: 225 mg/dL (2019 17:28)  POCT Blood Glucose.: 229 mg/dL (2019 12:46)    PHYSICAL EXAM:    Vital Signs Last 24 Hrs  T(C): 36.4 (2019 05:19), Max: 36.7 (2019 13:41)  T(F): 97.6 (2019 05:19), Max: 98 (2019 13:41)  HR: 72 (2019 05:19) (66 - 72)  BP: 142/62 (2019 05:19) (142/62 - 173/87)  BP(mean): --  RR: 18 (2019 05:19) (15 - 18)  SpO2: 97% (2019 05:19) (96% - 100%    )CONSTITUTIONAL: NAD, well-developed  RESPIRATORY: Normal respiratory effort; lungs are clear to auscultation bilaterally  CARDIOVASCULAR: Regular rate and rhythm, normal S1 and S2, no murmur/rub/gallop; No lower extremity edema; Peripheral pulses are 2+ bilaterally  ABDOMEN: Nontender to palpation, normoactive bowel sounds, no rebound/guarding; No hepatosplenomegaly  MUSCLOSKELETAL: no clubbing or cyanosis of digits; no joint swelling or tenderness to palpation  PSYCH: A+O to person, place, and time; affect appropriate, however reports forgetfulness   NEURO:No focal deficit   SKIN:    LABS:                                                       13.1   5.13  )-----------( 251      ( 2019 07:30 )             41.9     11-08    136  |  100  |  23  ----------------------------<  231<H>  3.4<L>   |  24  |  1.18    Ca    9.2      2019 07:30  Phos  2.5     11-07  Mg     1.9     11-08          Urinalysis Basic - ( 2019 12:20 )    Color: COLORLESS / Appearance: CLEAR / S.011 / pH: 7.0  Gluc: 200 / Ketone: NEGATIVE  / Bili: NEGATIVE / Urobili: NORMAL   Blood: NEGATIVE / Protein: 10 / Nitrite: NEGATIVE   Leuk Esterase: NEGATIVE / RBC: x / WBC x   Sq Epi: x / Non Sq Epi: x / Bacteria: x        Culture - Urine (collected 2019 12:40)  Source: URINE MIDSTREAM  Preliminary Report (2019 08:58):    NO GROWTH TO DATE        RADIOLOGY & ADDITIONAL TESTS:  Results Reviewed:   Imaging Personally Reviewed:  Electrocardiogram Personally Reviewed:    COORDINATION OF CARE:  Care Discussed with Consultants/Other Providers [Y/N]:  Prior or Outpatient Records Reviewed [Y/N]: PROGRESS NOTE:     CC: Patient is a 73y old  Male who presents with a chief complaint of hypoglycemic episode (2019 09:45)      SUBJECTIVE / OVERNIGHT EVENTS:  Pt seen and examined by me   Pt reports he is forgetful and doesn't remember details   Pt upset he is not getting his Insulin regimen   DW RN- Pt was insisting on giving him higher doses of insulin as he feels he needs more    ADDITIONAL REVIEW OF SYSTEMS:    MEDICATIONS  (STANDING):  aspirin  chewable 81 milliGRAM(s) Oral daily  atorvastatin 40 milliGRAM(s) Oral at bedtime  carvedilol 12.5 milliGRAM(s) Oral every 12 hours  dextrose 5%. 1000 milliLiter(s) (50 mL/Hr) IV Continuous <Continuous>  dextrose 50% Injectable 12.5 Gram(s) IV Push once  dextrose 50% Injectable 25 Gram(s) IV Push once  dextrose 50% Injectable 25 Gram(s) IV Push once  insulin lispro (HumaLOG) corrective regimen sliding scale   SubCutaneous three times a day before meals  insulin lispro (HumaLOG) corrective regimen sliding scale   SubCutaneous at bedtime  insulin lispro Injectable (HumaLOG) 2 Unit(s) SubCutaneous three times a day before meals  losartan 100 milliGRAM(s) Oral daily  NIFEdipine XL 30 milliGRAM(s) Oral daily  polyethylene glycol 3350 17 Gram(s) Oral daily  potassium chloride    Tablet ER 40 milliEquivalent(s) Oral once  senna 2 Tablet(s) Oral at bedtime  MEDICATIONS  (PRN):  dextrose 40% Gel 15 Gram(s) Oral once PRN Blood Glucose LESS THAN 70 milliGRAM(s)/deciliter  glucagon  Injectable 1 milliGRAM(s) IntraMuscular once PRN Glucose LESS THAN 70 milligrams/deciliter      CAPILLARY BLOOD GLUCOSE  POCT Blood Glucose.: 221 mg/dL (2019 08:52)  POCT Blood Glucose.: 234 mg/dL (2019 21:54)  POCT Blood Glucose.: 225 mg/dL (2019 17:28)  POCT Blood Glucose.: 229 mg/dL (2019 12:46)    PHYSICAL EXAM:    Vital Signs Last 24 Hrs  T(C): 36.4 (2019 05:19), Max: 36.7 (2019 13:41)  T(F): 97.6 (2019 05:19), Max: 98 (2019 13:41)  HR: 72 (2019 05:19) (66 - 72)  BP: 142/62 (2019 05:19) (142/62 - 173/87)  BP(mean): --  RR: 18 (2019 05:19) (15 - 18)  SpO2: 97% (2019 05:19) (96% - 100%    )CONSTITUTIONAL: NAD, well-developed  RESPIRATORY: Normal respiratory effort; lungs are clear to auscultation bilaterally  CARDIOVASCULAR: Regular rate and rhythm, normal S1 and S2, no murmur/rub/gallop; No lower extremity edema; Peripheral pulses are 2+ bilaterally  ABDOMEN: Nontender to palpation, normoactive bowel sounds, no rebound/guarding; No hepatosplenomegaly  MUSCLOSKELETAL: no clubbing or cyanosis of digits; no joint swelling or tenderness to palpation  PSYCH: A+O to person, place, and time; affect appropriate, however reports forgetfulness   NEURO:No focal deficit   SKIN:    LABS:                                                       13.1   5.13  )-----------( 251      ( 2019 07:30 )             41.9     11-08    136  |  100  |  23  ----------------------------<  231<H>  3.4<L>   |  24  |  1.18    Ca    9.2      2019 07:30  Phos  2.5     11-07  Mg     1.9     11-08          Urinalysis Basic - ( 2019 12:20 )    Color: COLORLESS / Appearance: CLEAR / S.011 / pH: 7.0  Gluc: 200 / Ketone: NEGATIVE  / Bili: NEGATIVE / Urobili: NORMAL   Blood: NEGATIVE / Protein: 10 / Nitrite: NEGATIVE   Leuk Esterase: NEGATIVE / RBC: x / WBC x   Sq Epi: x / Non Sq Epi: x / Bacteria: x        Culture - Urine (collected 2019 12:40)  Source: URINE MIDSTREAM  Preliminary Report (2019 08:58):    NO GROWTH TO DATE        RADIOLOGY & ADDITIONAL TESTS:  Results Reviewed:   Imaging Personally Reviewed:  Electrocardiogram Personally Reviewed:    COORDINATION OF CARE:  Care Discussed with Consultants/Other Providers [Y/N]:  Prior or Outpatient Records Reviewed [Y/N]:

## 2019-11-08 NOTE — PROGRESS NOTE ADULT - SUBJECTIVE AND OBJECTIVE BOX
Chief Complaint: t2dm with hypoglycemia    History: pt is poor historian and unable to give a good history.  reports good appetite.  no hypoglycemia s/s at time of visit, as per ACP team, patietns daughter will learn how to do insulin pen injections    MEDICATIONS  (STANDING):  aspirin  chewable 81 milliGRAM(s) Oral daily  atorvastatin 40 milliGRAM(s) Oral at bedtime  carvedilol 12.5 milliGRAM(s) Oral every 12 hours  dextrose 5%. 1000 milliLiter(s) (50 mL/Hr) IV Continuous <Continuous>  dextrose 50% Injectable 12.5 Gram(s) IV Push once  dextrose 50% Injectable 25 Gram(s) IV Push once  dextrose 50% Injectable 25 Gram(s) IV Push once  insulin lispro (HumaLOG) corrective regimen sliding scale   SubCutaneous three times a day before meals  insulin lispro (HumaLOG) corrective regimen sliding scale   SubCutaneous at bedtime  insulin lispro Injectable (HumaLOG) 2 Unit(s) SubCutaneous three times a day before meals  losartan 100 milliGRAM(s) Oral daily  NIFEdipine XL 30 milliGRAM(s) Oral daily  polyethylene glycol 3350 17 Gram(s) Oral daily  senna 2 Tablet(s) Oral at bedtime    MEDICATIONS  (PRN):  dextrose 40% Gel 15 Gram(s) Oral once PRN Blood Glucose LESS THAN 70 milliGRAM(s)/deciliter  glucagon  Injectable 1 milliGRAM(s) IntraMuscular once PRN Glucose LESS THAN 70 milligrams/deciliter      Allergies    No Known Allergies    Intolerances      Review of Systems:  Constitutional: No fever    ALL OTHER SYSTEMS REVIEWED AND NEGATIVE      Vital Signs Last 24 Hrs  T(C): 36.6 (08 Nov 2019 10:24), Max: 36.7 (07 Nov 2019 17:14)  T(F): 97.8 (08 Nov 2019 10:24), Max: 98 (07 Nov 2019 17:14)  HR: 75 (08 Nov 2019 10:24) (66 - 75)  BP: 134/79 (08 Nov 2019 10:24) (134/79 - 173/87)  BP(mean): --  RR: 18 (08 Nov 2019 10:24) (15 - 18)  SpO2: 100% (08 Nov 2019 10:24) (96% - 100%)  GENERAL: NAD, well-groomed, well-developed  GI: Soft, nontender, non distended  SKIN: Dry, intact, No rashes or lesions  PSYCH: Alert and oriented x 3, normal affect, normal mood      CAPILLARY BLOOD GLUCOSE      POCT Blood Glucose.: 239 mg/dL (08 Nov 2019 12:42)  POCT Blood Glucose.: 221 mg/dL (08 Nov 2019 08:52)  POCT Blood Glucose.: 234 mg/dL (07 Nov 2019 21:54)  POCT Blood Glucose.: 225 mg/dL (07 Nov 2019 17:28)    POCT Blood Glucose.: 229 mg/dL (07 Nov 2019 12:46)  POCT Blood Glucose.: 182 mg/dL (07 Nov 2019 08:55)  POCT Blood Glucose.: 233 mg/dL (06 Nov 2019 21:58)  POCT Blood Glucose.: 139 mg/dL (06 Nov 2019 17:24)      11-08    136  |  100  |  23  ----------------------------<  231<H>  3.4<L>   |  24  |  1.18    Ca    9.2      08 Nov 2019 07:30  Phos  2.5     11-07  Mg     1.9     11-08        Thyroid Function Tests:  11-06 @ 06:45 TSH 3.08 FreeT4 -- T3 -- Anti TPO -- Anti Thyroglobulin Ab -- TSI --      Hemoglobin A1C, Whole Blood: 7.9 % <H> [4.0 - 5.6] (11-06-19 @ 06:45)

## 2019-11-08 NOTE — DISCHARGE NOTE PROVIDER - CARE PROVIDER_API CALL
[facility],   Phone: [phone]  Fax: (   )    -  Follow Up Time:     Leonardo   Follow up in 1-2 weeks  Phone: (   )    -  Fax: (   )    -  Follow Up Time:

## 2019-11-08 NOTE — DISCHARGE NOTE PROVIDER - NSDCCPCAREPLAN_GEN_ALL_CORE_FT
PRINCIPAL DISCHARGE DIAGNOSIS  Diagnosis: Hypoglycemia  Assessment and Plan of Treatment: C PRINCIPAL DISCHARGE DIAGNOSIS  Diagnosis: Hypoglycemia  Assessment and Plan of Treatment: You were admitted for low blood sugar.  Be sure to check your blood sugars before meals and at bedtime. DO not take additional insulin without speaking to your doctor.  Know signs and symptoms of high and low blood sugar      SECONDARY DISCHARGE DIAGNOSES  Diagnosis: Type 2 diabetes mellitus with hypoglycemia without coma, with long-term current use of insulin  Assessment and Plan of Treatment: Continue consistent carbohydrate diet.  Monitor blood glucose levels throughout the day before meals and at bedtime.  Record blood sugars and bring to outpatient providers appointment in order to be reviewed by your doctor for management modifications.  Be aware of diabetes management symptoms including feeling cool and clammy may be related to low glucose levels.  Feeling hot and dry may indicate high glucose levels.  If  you feel these symptoms, check your blood sugar.  Make regular podiatry appointments in order to have feet checked for wounds and toe nails cut by a doctor to prevent infections.    Diagnosis: Dementia without behavioral disturbance, unspecified dementia type  Assessment and Plan of Treatment: Your family will be administering your insulin. DO not take additional insulin.    Diagnosis: Essential hypertension  Assessment and Plan of Treatment: Continue current blood pressure medication regimen as directed. Monitor for any visual changes, headaches or dizziness.  Monitor blood pressure regularly.  Follow up with your PCP for further management for high blood pressure, please call to make appointment within 1 week of discharge   Your Chlorthiadone was held during admission due to your BP being in range.  Please follow up with your PCP regarding when to resume outpatient. PRINCIPAL DISCHARGE DIAGNOSIS  Diagnosis: Hypoglycemia  Assessment and Plan of Treatment: You were admitted for low blood sugar.  Be sure to check your blood sugars before meals and at bedtime. DO not take additional insulin without speaking to your doctor.  Know signs and symptoms of high and low blood sugar      SECONDARY DISCHARGE DIAGNOSES  Diagnosis: Type 2 diabetes mellitus with hypoglycemia without coma, with long-term current use of insulin  Assessment and Plan of Treatment: please take metformin 500mg twice daily for 1 week follow by 1g teice daily if toelrates. Continue consistent carbohydrate diet.  Monitor blood glucose levels throughout the day before meals and at bedtime.  Record blood sugars and bring to outpatient providers appointment in order to be reviewed by your doctor for management modifications.  Be aware of diabetes management symptoms including feeling cool and clammy may be related to low glucose levels.  Feeling hot and dry may indicate high glucose levels.  If  you feel these symptoms, check your blood sugar.  Make regular podiatry appointments in order to have feet checked for wounds and toe nails cut by a doctor to prevent infections.    Diagnosis: Dementia without behavioral disturbance, unspecified dementia type  Assessment and Plan of Treatment: Your family will be administering your insulin. DO not take additional insulin.    Diagnosis: Hyperlipidemia, unspecified hyperlipidemia type  Assessment and Plan of Treatment: Continue to take your medication as ordered    Diagnosis: CKD (chronic kidney disease) stage 3, GFR 30-59 ml/min  Assessment and Plan of Treatment: Continue blood pressure, cholesterol and diabetic medications. Goal of hemoglobin A1C (HgbA1C) < 7%.  Avoid nephrotoxic drugs such as nonsteroidal anti-inflammatory agents (NSAIDs).   Please follow up with your nephrologist to monitor your kidney function, continue with low protein and potassium diet.    Diagnosis: Leukocytosis, unspecified type  Assessment and Plan of Treatment: no signs of infection, followup with PMD    Diagnosis: Coronary artery disease without angina pectoris, unspecified vessel or lesion type, unspecified whether native or transplanted heart  Assessment and Plan of Treatment: Continue aspirin, do not stop unless instructed by your physician.  Continue low salt, fat, cholesterol and carbohydrate diet. Follow up with cardiologist and primary care physician's routine appointment.    Diagnosis: Essential hypertension  Assessment and Plan of Treatment: Continue current blood pressure medication regimen as directed. Monitor for any visual changes, headaches or dizziness.  Monitor blood pressure regularly.  Follow up with your PCP for further management for high blood pressure, please call to make appointment within 1 week of discharge   Your Chlorthiadone was held during admission due to your BP being in range.  Please follow up with your PCP regarding when to resume outpatient.

## 2019-11-08 NOTE — PROGRESS NOTE ADULT - PROBLEM SELECTOR PLAN 9
-dvt ppx: improve score 1 (age)- monitor off dvt ppx  -diet DASH/cc   Called pts daughter Nova- left message, Family will need to come in for Insulin teaching   Dispo- Awaiting daughter to get back  CM to discuss discharge plan.FU final Endo rec re- dc regimen -dvt ppx: improve score 1 (age)- monitor off dvt ppx  -diet DASH/cc   Called pts daughter Nova- left message, Family will need to come in for Insulin teaching . CM was able to speak with daughter who will be coming in to learn Insulin teaching   Dispo-FU final Endo rec re- dc regimen -dvt ppx: improve score 1 (age)- monitor off dvt ppx  -diet DASH/cc   Called pts daughter Nova- left message, Family will need to come in for Insulin teaching . CM was able to speak with daughter who will be coming in to learn Insulin teaching   Dispo-FU final Endo rec re- dc regimen  DC planning 31mins

## 2019-11-08 NOTE — PROGRESS NOTE ADULT - REASON FOR ADMISSION
hypoglycemic episode

## 2019-11-08 NOTE — DISCHARGE NOTE PROVIDER - PROVIDER TOKENS
FREE:[LAST:[[facility]],PHONE:[phone]],FAX:[(   )    -]],FREE:[LAST:[[facility]],PHONE:[(   )    -],FAX:[(   )    -],ADDRESS:[Follow up in 1-2 weeks]]

## 2019-11-13 PROBLEM — Z00.00 ENCOUNTER FOR PREVENTIVE HEALTH EXAMINATION: Status: ACTIVE | Noted: 2019-11-13

## 2019-11-19 ENCOUNTER — APPOINTMENT (OUTPATIENT)
Dept: ENDOCRINOLOGY | Facility: CLINIC | Age: 74
End: 2019-11-19

## 2019-12-23 ENCOUNTER — APPOINTMENT (OUTPATIENT)
Dept: HOME HEALTH SERVICES | Facility: HOME HEALTH | Age: 74
End: 2019-12-23
Payer: MEDICARE

## 2019-12-23 VITALS
HEIGHT: 72 IN | OXYGEN SATURATION: 98 % | SYSTOLIC BLOOD PRESSURE: 170 MMHG | BODY MASS INDEX: 33.86 KG/M2 | HEART RATE: 98 BPM | DIASTOLIC BLOOD PRESSURE: 80 MMHG | RESPIRATION RATE: 16 BRPM | WEIGHT: 250 LBS

## 2019-12-23 DIAGNOSIS — H40.9 UNSPECIFIED GLAUCOMA: ICD-10-CM

## 2019-12-23 DIAGNOSIS — Z72.89 OTHER PROBLEMS RELATED TO LIFESTYLE: ICD-10-CM

## 2019-12-23 DIAGNOSIS — Z87.891 PERSONAL HISTORY OF NICOTINE DEPENDENCE: ICD-10-CM

## 2019-12-23 DIAGNOSIS — Z87.828 PERSONAL HISTORY OF OTHER (HEALED) PHYSICAL INJURY AND TRAUMA: ICD-10-CM

## 2019-12-23 DIAGNOSIS — R39.81 FUNCTIONAL URINARY INCONTINENCE: ICD-10-CM

## 2019-12-23 PROCEDURE — 99345 HOME/RES VST NEW HIGH MDM 75: CPT

## 2019-12-23 RX ORDER — LORATADINE 5 MG
17 TABLET,CHEWABLE ORAL
Refills: 0 | Status: COMPLETED | COMMUNITY
End: 2019-12-23

## 2019-12-23 RX ORDER — METFORMIN HYDROCHLORIDE 500 MG/1
500 TABLET, COATED ORAL
Refills: 0 | Status: COMPLETED | COMMUNITY
End: 2019-12-23

## 2019-12-25 PROBLEM — Z87.828 HISTORY OF MOTOR VEHICLE ACCIDENT: Status: RESOLVED | Noted: 2019-12-23 | Resolved: 2019-12-25

## 2019-12-25 NOTE — COUNSELING
[Overweight - ( BMI 25.1 - 29.9 )] : overweight -  ( BMI 25.1 - 29.9 ) [Non - Smoker] : non-smoker [Smoke/CO Detectors] : smoke/CO detectors [Continue diet as tolerated] : continue diet as tolerated based on goals of care [Use assistive device to avoid falls] : use assistive device to avoid falls [Likely to achieve goals/desired outcomes] : likely to achieve goals/desired outcomes [Decrease hospital use] : decrease hospital use

## 2019-12-25 NOTE — PHYSICAL EXAM
[No Acute Distress] : no acute distress [Normal Sclera/Conjunctiva] : normal sclera/conjunctiva [Normal Rate] : heart rate was normal  [Normal Outer Ear/Nose] : the ears and nose were normal in appearance [Regular Rhythm] : with a regular rhythm [Normal S1, S2] : normal S1 and S2 [Normal Bowel Sounds] : normal bowel sounds [Non Tender] : non-tender [No Gross Sensory Deficits] : no gross sensory deficits [No Rash] : no rash [No Motor Deficits] : the motor exam was normal [Normal Affect] : the affect was normal [de-identified] : overweight [de-identified] : unsteady gait

## 2019-12-25 NOTE — REASON FOR VISIT
[Initial Evaluation] : an initial evaluation [Pre-Visit Preparation] : pre-visit preparation was done [FreeTextEntry1] : dementia [FreeTextEntry3] : [facility] [FreeTextEntry2] : reviewed hosp d/c summary

## 2019-12-25 NOTE — CHRONIC CARE ASSESSMENT
[PPS Score: ____] : Palliative Performance Scale (PPS) Score: [unfilled] [de-identified] : none [de-identified] : regular diet

## 2020-01-02 ENCOUNTER — MEDICATION RENEWAL (OUTPATIENT)
Age: 75
End: 2020-01-02

## 2020-01-07 ENCOUNTER — TRANSCRIPTION ENCOUNTER (OUTPATIENT)
Age: 75
End: 2020-01-07

## 2020-01-08 ENCOUNTER — CLINICAL ADVICE (OUTPATIENT)
Age: 75
End: 2020-01-08

## 2020-01-27 ENCOUNTER — CLINICAL ADVICE (OUTPATIENT)
Age: 75
End: 2020-01-27

## 2020-01-28 ENCOUNTER — APPOINTMENT (OUTPATIENT)
Dept: HOME HEALTH SERVICES | Facility: HOME HEALTH | Age: 75
End: 2020-01-28

## 2020-01-29 ENCOUNTER — APPOINTMENT (OUTPATIENT)
Dept: NEPHROLOGY | Facility: CLINIC | Age: 75
End: 2020-01-29

## 2020-02-04 ENCOUNTER — APPOINTMENT (OUTPATIENT)
Dept: HOME HEALTH SERVICES | Facility: HOME HEALTH | Age: 75
End: 2020-02-04
Payer: MEDICARE

## 2020-02-04 VITALS
HEART RATE: 76 BPM | SYSTOLIC BLOOD PRESSURE: 165 MMHG | DIASTOLIC BLOOD PRESSURE: 90 MMHG | RESPIRATION RATE: 16 BRPM | OXYGEN SATURATION: 97 %

## 2020-02-04 PROCEDURE — 99349 HOME/RES VST EST MOD MDM 40: CPT

## 2020-02-04 NOTE — COUNSELING
[Overweight - ( BMI 25.1 - 29.9 )] : overweight -  ( BMI 25.1 - 29.9 ) [Non - Smoker] : non-smoker [Continue diet as tolerated] : continue diet as tolerated based on goals of care [Smoke/CO Detectors] : smoke/CO detectors [Use assistive device to avoid falls] : use assistive device to avoid falls [Decrease hospital use] : decrease hospital use [Likely to achieve goals/desired outcomes] : likely to achieve goals/desired outcomes

## 2020-02-04 NOTE — REASON FOR VISIT
[Follow-Up] : a follow-up visit [Family Member] : family member [FreeTextEntry1] : dementia, HTN [FreeTextEntry3] : [place]

## 2020-02-04 NOTE — CHRONIC CARE ASSESSMENT
[PPS Score: ____] : Palliative Performance Scale (PPS) Score: [unfilled] [de-identified] : none [de-identified] : regular diet

## 2020-02-04 NOTE — PHYSICAL EXAM
[Normal Sclera/Conjunctiva] : normal sclera/conjunctiva [No Acute Distress] : no acute distress [Regular Rhythm] : with a regular rhythm [Normal Rate] : heart rate was normal  [Normal Outer Ear/Nose] : the ears and nose were normal in appearance [Normal Bowel Sounds] : normal bowel sounds [Normal S1, S2] : normal S1 and S2 [Non Tender] : non-tender [No Motor Deficits] : the motor exam was normal [No Rash] : no rash [Normal Affect] : the affect was normal [No Gross Sensory Deficits] : no gross sensory deficits [de-identified] : unsteady gait [de-identified] : overweight

## 2020-02-04 NOTE — HISTORY OF PRESENT ILLNESS
[Patient] : patient [Family Member] : family member [FreeTextEntry2] :  74M h/o HTN, T2DM, CAD (s/p stent), CVA (residual memory loss, AAOx2), dementia\par \par DM- on lantus - taking 10-20 units QSH. Using Novolog 70/30 PRN high BS. Has been measuring BS after eating rather than before\par \par HTN on carvedilol 12.5 BID, chlorthalidone 25, losartan 100, nifedipine 30. Has run out of losartan and refill was ordered but not delivered. \par \par High chol-on atorvastatin 40\par \par Dementia- on memantine. forgetful\par Last fall end of november. \par constipation- lactulose is effective.\par

## 2020-02-04 NOTE — LETTER HEADER
[Care Plan reviewed and provided to patient/caregiver] : Care plan reviewed and provided to patient/caregiver [Care Plan reviewed every ___ weeks] : Care plan reviewed every [unfilled] weeks [Initiation or substantial revisions made to care plan involving mod/high medical decision making for complex CCM] : Initiation or substantial revisions made to care plan involving mod/high medical decision making for complex CCM [Care Plan managed/Care coordinated by: ___] : Care plan managed/Care coordinated by: [unfilled] [Patient/Caregiver agrees to have other providers send summary of their care to this office] : Patient/caregiver agrees to have other providers send summary of their care to this office

## 2020-02-07 LAB
ALBUMIN SERPL ELPH-MCNC: 4.4 G/DL
ALP BLD-CCNC: 78 U/L
ALT SERPL-CCNC: 15 U/L
ANION GAP SERPL CALC-SCNC: 17 MMOL/L
AST SERPL-CCNC: 17 U/L
BASOPHILS # BLD AUTO: 0.05 K/UL
BASOPHILS NFR BLD AUTO: 1 %
BILIRUB SERPL-MCNC: 0.6 MG/DL
BUN SERPL-MCNC: 14 MG/DL
CALCIUM SERPL-MCNC: 9.8 MG/DL
CHLORIDE SERPL-SCNC: 96 MMOL/L
CO2 SERPL-SCNC: 28 MMOL/L
CREAT SERPL-MCNC: 1.31 MG/DL
EOSINOPHIL # BLD AUTO: 0.3 K/UL
EOSINOPHIL NFR BLD AUTO: 5.9 %
ESTIMATED AVERAGE GLUCOSE: 226 MG/DL
HBA1C MFR BLD HPLC: 9.5 %
HCT VFR BLD CALC: 44.3 %
HGB BLD-MCNC: 13.9 G/DL
IMM GRANULOCYTES NFR BLD AUTO: 0.2 %
LYMPHOCYTES # BLD AUTO: 1 K/UL
LYMPHOCYTES NFR BLD AUTO: 19.6 %
MAN DIFF?: NORMAL
MCHC RBC-ENTMCNC: 26.6 PG
MCHC RBC-ENTMCNC: 31.4 GM/DL
MCV RBC AUTO: 84.7 FL
MONOCYTES # BLD AUTO: 0.48 K/UL
MONOCYTES NFR BLD AUTO: 9.4 %
NEUTROPHILS # BLD AUTO: 3.27 K/UL
NEUTROPHILS NFR BLD AUTO: 63.9 %
PLATELET # BLD AUTO: 264 K/UL
POTASSIUM SERPL-SCNC: 3.1 MMOL/L
PROT SERPL-MCNC: 6.9 G/DL
RBC # BLD: 5.23 M/UL
RBC # FLD: 12.9 %
SODIUM SERPL-SCNC: 141 MMOL/L
WBC # FLD AUTO: 5.11 K/UL

## 2020-02-10 ENCOUNTER — APPOINTMENT (OUTPATIENT)
Dept: HOME HEALTH SERVICES | Facility: HOME HEALTH | Age: 75
End: 2020-02-10

## 2020-02-18 ENCOUNTER — APPOINTMENT (OUTPATIENT)
Dept: ENDOCRINOLOGY | Facility: CLINIC | Age: 75
End: 2020-02-18

## 2020-03-05 ENCOUNTER — INPATIENT (INPATIENT)
Facility: HOSPITAL | Age: 75
LOS: 0 days | Discharge: HOME | End: 2020-03-06
Attending: INTERNAL MEDICINE | Admitting: INTERNAL MEDICINE
Payer: MEDICARE

## 2020-03-05 VITALS
DIASTOLIC BLOOD PRESSURE: 80 MMHG | OXYGEN SATURATION: 100 % | TEMPERATURE: 98 F | SYSTOLIC BLOOD PRESSURE: 165 MMHG | WEIGHT: 190.04 LBS | HEART RATE: 64 BPM | RESPIRATION RATE: 18 BRPM

## 2020-03-05 DIAGNOSIS — E78.5 HYPERLIPIDEMIA, UNSPECIFIED: ICD-10-CM

## 2020-03-05 DIAGNOSIS — E16.2 HYPOGLYCEMIA, UNSPECIFIED: ICD-10-CM

## 2020-03-05 DIAGNOSIS — E11.9 TYPE 2 DIABETES MELLITUS WITHOUT COMPLICATIONS: ICD-10-CM

## 2020-03-05 DIAGNOSIS — I25.10 ATHEROSCLEROTIC HEART DISEASE OF NATIVE CORONARY ARTERY WITHOUT ANGINA PECTORIS: Chronic | ICD-10-CM

## 2020-03-05 DIAGNOSIS — I10 ESSENTIAL (PRIMARY) HYPERTENSION: ICD-10-CM

## 2020-03-05 LAB
ALBUMIN SERPL ELPH-MCNC: 3.2 G/DL — LOW (ref 3.3–5)
ALP SERPL-CCNC: 73 U/L — SIGNIFICANT CHANGE UP (ref 40–120)
ALT FLD-CCNC: 19 U/L — SIGNIFICANT CHANGE UP (ref 12–78)
ANION GAP SERPL CALC-SCNC: 7 MMOL/L — SIGNIFICANT CHANGE UP (ref 5–17)
APPEARANCE UR: CLEAR — SIGNIFICANT CHANGE UP
APTT BLD: 35.3 SEC — SIGNIFICANT CHANGE UP (ref 27.5–36.3)
AST SERPL-CCNC: 16 U/L — SIGNIFICANT CHANGE UP (ref 15–37)
BACTERIA # UR AUTO: NEGATIVE — SIGNIFICANT CHANGE UP
BASOPHILS # BLD AUTO: 0.04 K/UL — SIGNIFICANT CHANGE UP (ref 0–0.2)
BASOPHILS NFR BLD AUTO: 0.4 % — SIGNIFICANT CHANGE UP (ref 0–2)
BILIRUB SERPL-MCNC: 0.6 MG/DL — SIGNIFICANT CHANGE UP (ref 0.2–1.2)
BILIRUB UR-MCNC: NEGATIVE — SIGNIFICANT CHANGE UP
BUN SERPL-MCNC: 20 MG/DL — SIGNIFICANT CHANGE UP (ref 7–23)
CALCIUM SERPL-MCNC: 9.3 MG/DL — SIGNIFICANT CHANGE UP (ref 8.5–10.1)
CHLORIDE SERPL-SCNC: 107 MMOL/L — SIGNIFICANT CHANGE UP (ref 96–108)
CO2 SERPL-SCNC: 28 MMOL/L — SIGNIFICANT CHANGE UP (ref 22–31)
COLOR SPEC: YELLOW — SIGNIFICANT CHANGE UP
CREAT SERPL-MCNC: 1.34 MG/DL — HIGH (ref 0.5–1.3)
DIFF PNL FLD: ABNORMAL
EOSINOPHIL # BLD AUTO: 0.11 K/UL — SIGNIFICANT CHANGE UP (ref 0–0.5)
EOSINOPHIL NFR BLD AUTO: 1.1 % — SIGNIFICANT CHANGE UP (ref 0–6)
EPI CELLS # UR: NEGATIVE — SIGNIFICANT CHANGE UP
GLUCOSE BLDC GLUCOMTR-MCNC: 101 MG/DL — HIGH (ref 70–99)
GLUCOSE BLDC GLUCOMTR-MCNC: 153 MG/DL — HIGH (ref 70–99)
GLUCOSE BLDC GLUCOMTR-MCNC: 244 MG/DL — HIGH (ref 70–99)
GLUCOSE BLDC GLUCOMTR-MCNC: 296 MG/DL — HIGH (ref 70–99)
GLUCOSE BLDC GLUCOMTR-MCNC: 58 MG/DL — LOW (ref 70–99)
GLUCOSE BLDC GLUCOMTR-MCNC: 68 MG/DL — LOW (ref 70–99)
GLUCOSE SERPL-MCNC: 110 MG/DL — HIGH (ref 70–99)
GLUCOSE UR QL: 50 MG/DL
HCT VFR BLD CALC: 42.5 % — SIGNIFICANT CHANGE UP (ref 39–50)
HGB BLD-MCNC: 13.4 G/DL — SIGNIFICANT CHANGE UP (ref 13–17)
IMM GRANULOCYTES NFR BLD AUTO: 0.3 % — SIGNIFICANT CHANGE UP (ref 0–1.5)
INR BLD: 1.08 RATIO — SIGNIFICANT CHANGE UP (ref 0.88–1.16)
KETONES UR-MCNC: NEGATIVE — SIGNIFICANT CHANGE UP
LEUKOCYTE ESTERASE UR-ACNC: NEGATIVE — SIGNIFICANT CHANGE UP
LYMPHOCYTES # BLD AUTO: 0.9 K/UL — LOW (ref 1–3.3)
LYMPHOCYTES # BLD AUTO: 8.8 % — LOW (ref 13–44)
MCHC RBC-ENTMCNC: 26.9 PG — LOW (ref 27–34)
MCHC RBC-ENTMCNC: 31.5 GM/DL — LOW (ref 32–36)
MCV RBC AUTO: 85.3 FL — SIGNIFICANT CHANGE UP (ref 80–100)
MONOCYTES # BLD AUTO: 0.58 K/UL — SIGNIFICANT CHANGE UP (ref 0–0.9)
MONOCYTES NFR BLD AUTO: 5.7 % — SIGNIFICANT CHANGE UP (ref 2–14)
NEUTROPHILS # BLD AUTO: 8.52 K/UL — HIGH (ref 1.8–7.4)
NEUTROPHILS NFR BLD AUTO: 83.7 % — HIGH (ref 43–77)
NITRITE UR-MCNC: NEGATIVE — SIGNIFICANT CHANGE UP
NRBC # BLD: 0 /100 WBCS — SIGNIFICANT CHANGE UP (ref 0–0)
PH UR: 7 — SIGNIFICANT CHANGE UP (ref 5–8)
PLATELET # BLD AUTO: 222 K/UL — SIGNIFICANT CHANGE UP (ref 150–400)
POTASSIUM SERPL-MCNC: 3.9 MMOL/L — SIGNIFICANT CHANGE UP (ref 3.5–5.3)
POTASSIUM SERPL-SCNC: 3.9 MMOL/L — SIGNIFICANT CHANGE UP (ref 3.5–5.3)
PROT SERPL-MCNC: 7.5 GM/DL — SIGNIFICANT CHANGE UP (ref 6–8.3)
PROT UR-MCNC: 15 MG/DL
PROTHROM AB SERPL-ACNC: 12.1 SEC — SIGNIFICANT CHANGE UP (ref 10–12.9)
RBC # BLD: 4.98 M/UL — SIGNIFICANT CHANGE UP (ref 4.2–5.8)
RBC # FLD: 12.9 % — SIGNIFICANT CHANGE UP (ref 10.3–14.5)
RBC CASTS # UR COMP ASSIST: SIGNIFICANT CHANGE UP /HPF (ref 0–4)
SODIUM SERPL-SCNC: 142 MMOL/L — SIGNIFICANT CHANGE UP (ref 135–145)
SP GR SPEC: 1 — LOW (ref 1.01–1.02)
UROBILINOGEN FLD QL: NEGATIVE MG/DL — SIGNIFICANT CHANGE UP
WBC # BLD: 10.18 K/UL — SIGNIFICANT CHANGE UP (ref 3.8–10.5)
WBC # FLD AUTO: 10.18 K/UL — SIGNIFICANT CHANGE UP (ref 3.8–10.5)
WBC UR QL: NEGATIVE — SIGNIFICANT CHANGE UP

## 2020-03-05 PROCEDURE — 70450 CT HEAD/BRAIN W/O DYE: CPT | Mod: 26

## 2020-03-05 PROCEDURE — 71045 X-RAY EXAM CHEST 1 VIEW: CPT | Mod: 26

## 2020-03-05 PROCEDURE — 93010 ELECTROCARDIOGRAM REPORT: CPT

## 2020-03-05 PROCEDURE — 99285 EMERGENCY DEPT VISIT HI MDM: CPT

## 2020-03-05 PROCEDURE — 99223 1ST HOSP IP/OBS HIGH 75: CPT

## 2020-03-05 RX ORDER — NIFEDIPINE 30 MG
1 TABLET, EXTENDED RELEASE 24 HR ORAL
Qty: 0 | Refills: 0 | DISCHARGE

## 2020-03-05 RX ORDER — SENNA PLUS 8.6 MG/1
1 TABLET ORAL AT BEDTIME
Refills: 0 | Status: DISCONTINUED | OUTPATIENT
Start: 2020-03-05 | End: 2020-03-06

## 2020-03-05 RX ORDER — DEXTROSE 50 % IN WATER 50 %
25 SYRINGE (ML) INTRAVENOUS ONCE
Refills: 0 | Status: DISCONTINUED | OUTPATIENT
Start: 2020-03-05 | End: 2020-03-06

## 2020-03-05 RX ORDER — LOSARTAN POTASSIUM 100 MG/1
100 TABLET, FILM COATED ORAL DAILY
Refills: 0 | Status: DISCONTINUED | OUTPATIENT
Start: 2020-03-05 | End: 2020-03-06

## 2020-03-05 RX ORDER — CHLORTHALIDONE 50 MG
1 TABLET ORAL
Qty: 0 | Refills: 0 | DISCHARGE

## 2020-03-05 RX ORDER — MEMANTINE HYDROCHLORIDE 10 MG/1
1 TABLET ORAL
Qty: 0 | Refills: 0 | DISCHARGE

## 2020-03-05 RX ORDER — ASPIRIN/CALCIUM CARB/MAGNESIUM 324 MG
1 TABLET ORAL
Qty: 0 | Refills: 0 | DISCHARGE

## 2020-03-05 RX ORDER — ASPIRIN/CALCIUM CARB/MAGNESIUM 324 MG
81 TABLET ORAL DAILY
Refills: 0 | Status: DISCONTINUED | OUTPATIENT
Start: 2020-03-05 | End: 2020-03-06

## 2020-03-05 RX ORDER — CARVEDILOL PHOSPHATE 80 MG/1
12.5 CAPSULE, EXTENDED RELEASE ORAL EVERY 12 HOURS
Refills: 0 | Status: DISCONTINUED | OUTPATIENT
Start: 2020-03-05 | End: 2020-03-06

## 2020-03-05 RX ORDER — ATORVASTATIN CALCIUM 80 MG/1
40 TABLET, FILM COATED ORAL AT BEDTIME
Refills: 0 | Status: DISCONTINUED | OUTPATIENT
Start: 2020-03-05 | End: 2020-03-06

## 2020-03-05 RX ORDER — CITALOPRAM 10 MG/1
1 TABLET, FILM COATED ORAL
Qty: 0 | Refills: 0 | DISCHARGE

## 2020-03-05 RX ORDER — SODIUM CHLORIDE 9 MG/ML
1000 INJECTION, SOLUTION INTRAVENOUS
Refills: 0 | Status: DISCONTINUED | OUTPATIENT
Start: 2020-03-05 | End: 2020-03-06

## 2020-03-05 RX ORDER — DEXTROSE 50 % IN WATER 50 %
15 SYRINGE (ML) INTRAVENOUS ONCE
Refills: 0 | Status: DISCONTINUED | OUTPATIENT
Start: 2020-03-05 | End: 2020-03-06

## 2020-03-05 RX ORDER — POLYETHYLENE GLYCOL 3350 17 G/17G
17 POWDER, FOR SOLUTION ORAL
Qty: 0 | Refills: 0 | DISCHARGE

## 2020-03-05 RX ORDER — LOSARTAN POTASSIUM 100 MG/1
1 TABLET, FILM COATED ORAL
Qty: 0 | Refills: 0 | DISCHARGE

## 2020-03-05 RX ORDER — NIFEDIPINE 30 MG
30 TABLET, EXTENDED RELEASE 24 HR ORAL DAILY
Refills: 0 | Status: DISCONTINUED | OUTPATIENT
Start: 2020-03-05 | End: 2020-03-06

## 2020-03-05 RX ORDER — INSULIN LISPRO 100/ML
VIAL (ML) SUBCUTANEOUS AT BEDTIME
Refills: 0 | Status: DISCONTINUED | OUTPATIENT
Start: 2020-03-05 | End: 2020-03-06

## 2020-03-05 RX ORDER — DEXTROSE 50 % IN WATER 50 %
12.5 SYRINGE (ML) INTRAVENOUS ONCE
Refills: 0 | Status: DISCONTINUED | OUTPATIENT
Start: 2020-03-05 | End: 2020-03-06

## 2020-03-05 RX ORDER — INSULIN LISPRO 100/ML
VIAL (ML) SUBCUTANEOUS
Refills: 0 | Status: DISCONTINUED | OUTPATIENT
Start: 2020-03-05 | End: 2020-03-06

## 2020-03-05 RX ORDER — ATORVASTATIN CALCIUM 80 MG/1
1 TABLET, FILM COATED ORAL
Qty: 0 | Refills: 0 | DISCHARGE

## 2020-03-05 RX ORDER — GLUCAGON INJECTION, SOLUTION 0.5 MG/.1ML
1 INJECTION, SOLUTION SUBCUTANEOUS ONCE
Refills: 0 | Status: DISCONTINUED | OUTPATIENT
Start: 2020-03-05 | End: 2020-03-06

## 2020-03-05 RX ORDER — POLYETHYLENE GLYCOL 3350 17 G/17G
17 POWDER, FOR SOLUTION ORAL AT BEDTIME
Refills: 0 | Status: DISCONTINUED | OUTPATIENT
Start: 2020-03-05 | End: 2020-03-06

## 2020-03-05 RX ORDER — SODIUM CHLORIDE 9 MG/ML
1000 INJECTION, SOLUTION INTRAVENOUS
Refills: 0 | Status: COMPLETED | OUTPATIENT
Start: 2020-03-05 | End: 2020-03-05

## 2020-03-05 RX ORDER — POLYETHYLENE GLYCOL 3350 17 G/17G
17 POWDER, FOR SOLUTION ORAL DAILY
Refills: 0 | Status: DISCONTINUED | OUTPATIENT
Start: 2020-03-05 | End: 2020-03-05

## 2020-03-05 RX ADMIN — LOSARTAN POTASSIUM 100 MILLIGRAM(S): 100 TABLET, FILM COATED ORAL at 16:43

## 2020-03-05 RX ADMIN — Medication 81 MILLIGRAM(S): at 16:43

## 2020-03-05 RX ADMIN — ATORVASTATIN CALCIUM 40 MILLIGRAM(S): 80 TABLET, FILM COATED ORAL at 21:18

## 2020-03-05 RX ADMIN — CARVEDILOL PHOSPHATE 12.5 MILLIGRAM(S): 80 CAPSULE, EXTENDED RELEASE ORAL at 18:30

## 2020-03-05 RX ADMIN — SODIUM CHLORIDE 75 MILLILITER(S): 9 INJECTION, SOLUTION INTRAVENOUS at 14:25

## 2020-03-05 RX ADMIN — Medication 30 MILLIGRAM(S): at 16:43

## 2020-03-05 RX ADMIN — Medication 1: at 21:19

## 2020-03-05 RX ADMIN — Medication 2: at 16:43

## 2020-03-05 NOTE — PATIENT PROFILE ADULT - LIVING ENVIRONMENT
Mane Guzman (MD)  Gastroenterology  [address] [place] [address]  Phone: [phone]  Fax: [phone]  Follow Up Time:
no

## 2020-03-05 NOTE — H&P ADULT - ASSESSMENT
74 year old male with PMH DM, HTN, MI with hypoglycemia    IMPROVE VTE Individual Risk Assessment          RISK                                                          Points    [  ] Previous VTE                                                3    [  ] Thrombophilia                                             2    [  ] Lower limb paralysis                                    2        (unable to hold up >15 seconds)      [  ] Current Cancer                                             2         (within 6 months)    [  ] Immobilization > 24 hrs                              1    [  ] ICU/CCU stay > 24 hours                            1    [X  ] Age > 60                                                    1    IMPROVE VTE Score _____1____    Low risk 0-1: [  ] Early ambulation                          [X  ] Intermittent Compression Device    High Risk 2-12: [  ] Heparin 5,000 units SC Q8 H                              [  ] Heparin 5,000 units SC Q 12 H                              [  ] LMWH 40 mg SC daily (CrCl > 30 ml)

## 2020-03-05 NOTE — ED ADULT TRIAGE NOTE - CHIEF COMPLAINT QUOTE
Pt has hx of Dm complains of AMS on bus, pt was hypoglycemic, given 50 dextrose IVP by ems, pt ate chocolate and given tray Pt has hx of Dm complains of AMS on bus, pt was hypoglycemic, given 50 dextrose IVP by ems, pt ate chocolate and given tray fs 58 pt eating now

## 2020-03-05 NOTE — ED PROVIDER NOTE - OBJECTIVE STATEMENT
74 years old male by ems c/o pt was confused in the bus EMS sts pt had blood sugar of lower than 20 pt received dextro 50% iv and then pt ate a chocolate now pt is drinking juice and eating in the stretcher. 74 years old male by ems c/o pt was confused in the bus EMS sts pt had blood sugar of lower than 20 pt received dextro 50% iv and then pt ate a chocolate now pt is drinking juice and eating sandwiches in the stretcher. Pt is alert and oriented x 3 sts he thinks he gave himself too much novolog this morning. Pt denies headache, dizziness, blurred visions, light sensitivities, focal/distal weakness or numbness, cough, sob, chest pain, nausea, vomiting, fever, chills, abd pain, dysuria or irregular bowel movements.

## 2020-03-05 NOTE — ED PROVIDER NOTE - PROGRESS NOTE DETAILS
Pt remain alert and oriented x 3 on dextro 5 water at 75 cc per hour accu chest 153 now Pt had Basaglar insulin and novolog this morning. Dr. Ramon is notified and admit pt.

## 2020-03-05 NOTE — H&P ADULT - PROBLEM SELECTOR PLAN 1
Admit to med-surg  cont D5W infusion for now  Finger sticks every ac/hs with low dose humulog insulin sliding scale coverage

## 2020-03-05 NOTE — ED ADULT NURSE NOTE - NSIMPLEMENTINTERV_GEN_ALL_ED
Implemented All Fall with Harm Risk Interventions:  Closplint to call system. Call bell, personal items and telephone within reach. Instruct patient to call for assistance. Room bathroom lighting operational. Non-slip footwear when patient is off stretcher. Physically safe environment: no spills, clutter or unnecessary equipment. Stretcher in lowest position, wheels locked, appropriate side rails in place. Provide visual cue, wrist band, yellow gown, etc. Monitor gait and stability. Monitor for mental status changes and reorient to person, place, and time. Review medications for side effects contributing to fall risk. Reinforce activity limits and safety measures with patient and family. Provide visual clues: red socks.

## 2020-03-05 NOTE — H&P ADULT - NSHPPHYSICALEXAM_GEN_ALL_CORE
ICU Vital Signs Last 24 Hrs  T(C): 36.7 (05 Mar 2020 12:44), Max: 36.7 (05 Mar 2020 12:44)  T(F): 98 (05 Mar 2020 12:44), Max: 98 (05 Mar 2020 12:44)  HR: 64 (05 Mar 2020 12:44) (64 - 64)  BP: 165/80 (05 Mar 2020 12:44) (165/80 - 165/80)  BP(mean): --  ABP: --  ABP(mean): --  RR: 18 (05 Mar 2020 12:44) (18 - 18)  SpO2: 100% (05 Mar 2020 12:44) (100% - 100%)

## 2020-03-05 NOTE — ED PROVIDER NOTE - CONSTITUTIONAL, MLM
normal... Well appearing, awake, alert, oriented to person, place, time/situation and in no apparent distress. Speaking in clear full sentences no nasal flaring no shoulders retractions no diaphoresis, sitting up eating and tolerating sandwiches.

## 2020-03-05 NOTE — H&P ADULT - PROBLEM SELECTOR PLAN 2
Finger sticks every ac/hs with humulog insulin sliding scale coverage   a1c in am  hold metformin for now

## 2020-03-05 NOTE — ED ADULT NURSE NOTE - CHIEF COMPLAINT QUOTE
Pt has hx of Dm complains of AMS on bus, pt was hypoglycemic, given 50 dextrose IVP by ems, pt ate chocolate and given tray fs 58 pt eating now

## 2020-03-06 ENCOUNTER — TRANSCRIPTION ENCOUNTER (OUTPATIENT)
Age: 75
End: 2020-03-06

## 2020-03-06 VITALS
TEMPERATURE: 98 F | RESPIRATION RATE: 17 BRPM | HEART RATE: 79 BPM | OXYGEN SATURATION: 100 % | DIASTOLIC BLOOD PRESSURE: 82 MMHG | SYSTOLIC BLOOD PRESSURE: 145 MMHG

## 2020-03-06 LAB
CULTURE RESULTS: SIGNIFICANT CHANGE UP
GLUCOSE BLDC GLUCOMTR-MCNC: 241 MG/DL — HIGH (ref 70–99)
GLUCOSE BLDC GLUCOMTR-MCNC: 255 MG/DL — HIGH (ref 70–99)
GLUCOSE BLDC GLUCOMTR-MCNC: 271 MG/DL — HIGH (ref 70–99)
HBA1C BLD-MCNC: 9.8 % — HIGH (ref 4–5.6)
SPECIMEN SOURCE: SIGNIFICANT CHANGE UP

## 2020-03-06 PROCEDURE — 99239 HOSP IP/OBS DSCHRG MGMT >30: CPT

## 2020-03-06 RX ORDER — INSULIN ASPART 100 [IU]/ML
5 INJECTION, SOLUTION SUBCUTANEOUS
Qty: 0 | Refills: 0 | DISCHARGE

## 2020-03-06 RX ORDER — DOCUSATE SODIUM 100 MG
1 CAPSULE ORAL
Qty: 0 | Refills: 0 | DISCHARGE

## 2020-03-06 RX ORDER — INSULIN ASPART 100 [IU]/ML
10 INJECTION, SOLUTION SUBCUTANEOUS
Qty: 0 | Refills: 0 | DISCHARGE

## 2020-03-06 RX ORDER — INSULIN GLARGINE 100 [IU]/ML
8 INJECTION, SOLUTION SUBCUTANEOUS AT BEDTIME
Refills: 0 | Status: DISCONTINUED | OUTPATIENT
Start: 2020-03-06 | End: 2020-03-06

## 2020-03-06 RX ADMIN — CARVEDILOL PHOSPHATE 12.5 MILLIGRAM(S): 80 CAPSULE, EXTENDED RELEASE ORAL at 05:15

## 2020-03-06 RX ADMIN — Medication 3: at 12:05

## 2020-03-06 RX ADMIN — SENNA PLUS 1 TABLET(S): 8.6 TABLET ORAL at 00:23

## 2020-03-06 RX ADMIN — Medication 30 MILLIGRAM(S): at 05:15

## 2020-03-06 RX ADMIN — Medication 81 MILLIGRAM(S): at 12:11

## 2020-03-06 RX ADMIN — LOSARTAN POTASSIUM 100 MILLIGRAM(S): 100 TABLET, FILM COATED ORAL at 05:15

## 2020-03-06 RX ADMIN — POLYETHYLENE GLYCOL 3350 17 GRAM(S): 17 POWDER, FOR SOLUTION ORAL at 00:24

## 2020-03-06 RX ADMIN — Medication 2: at 08:16

## 2020-03-06 NOTE — DISCHARGE NOTE PROVIDER - NSDCMRMEDTOKEN_GEN_ALL_CORE_FT
Aspirin Enteric Coated 81 mg oral delayed release tablet: 1 tab(s) orally once a day  atorvastatin 40 mg oral tablet: 1 tab(s) orally once a day  Basaglar KwikPen 100 units/mL subcutaneous solution: 10 unit(s) subcutaneous once a day (at bedtime)  carvedilol 12.5 mg oral tablet: 1 tab(s) orally every 12 hours  chlorthalidone 25 mg oral tablet: 1 tab(s) orally once a day  citalopram 10 mg oral tablet: 1 tab(s) orally once a day  losartan 100 mg oral tablet: 1 tab(s) orally once a day  memantine 10 mg oral tablet: 1 tab(s) orally once a day  NIFEdipine 30 mg oral tablet, extended release: 1 tab(s) orally once a day  NovoLOG FlexPen 100 units/mL injectable solution: Up to 10 units each AM, depending on blood sugar reading

## 2020-03-06 NOTE — PROGRESS NOTE ADULT - SUBJECTIVE AND OBJECTIVE BOX
Patient is a 74y old  Male who presents with a chief complaint of Hypoglycemia (05 Mar 2020 15:58)      INTERVAL HPI/OVERNIGHT EVENTS:    Kept on D5W drip overnight at 50cc/hr    REVIEW OF SYSTEMS:   Remaining ROS negative    MEDICATIONS  (STANDING):  aspirin enteric coated 81 milliGRAM(s) Oral daily  atorvastatin 40 milliGRAM(s) Oral at bedtime  carvedilol 12.5 milliGRAM(s) Oral every 12 hours  dextrose 5%. 1000 milliLiter(s) (50 mL/Hr) IV Continuous <Continuous>  dextrose 50% Injectable 12.5 Gram(s) IV Push once  dextrose 50% Injectable 25 Gram(s) IV Push once  dextrose 50% Injectable 25 Gram(s) IV Push once  insulin lispro (HumaLOG) corrective regimen sliding scale   SubCutaneous three times a day before meals  insulin lispro (HumaLOG) corrective regimen sliding scale   SubCutaneous at bedtime  losartan 100 milliGRAM(s) Oral daily  NIFEdipine XL 30 milliGRAM(s) Oral daily  polyethylene glycol 3350 17 Gram(s) Oral at bedtime  senna 1 Tablet(s) Oral at bedtime    MEDICATIONS  (PRN):  bisacodyl 5 milliGRAM(s) Oral every 12 hours PRN Constipation  dextrose 40% Gel 15 Gram(s) Oral once PRN Blood Glucose LESS THAN 70 milliGRAM(s)/deciliter  glucagon  Injectable 1 milliGRAM(s) IntraMuscular once PRN Glucose LESS THAN 70 milligrams/deciliter      Allergies    No Known Allergies    Intolerances        Vital Signs Last 24 Hrs  T(C): 36.2 (06 Mar 2020 06:27), Max: 36.8 (05 Mar 2020 16:09)  T(F): 97.1 (06 Mar 2020 06:27), Max: 98.3 (05 Mar 2020 16:09)  HR: 62 (06 Mar 2020 06:27) (56 - 77)  BP: 121/63 (06 Mar 2020 06:27) (114/62 - 171/86)  BP(mean): --  RR: 18 (06 Mar 2020 06:27) (18 - 18)  SpO2: 95% (06 Mar 2020 06:27) (95% - 100%)    PHYSICAL EXAM:  GENERAL: NAD  HEAD:  Atraumatic, Normocephalic  EYES: EOMI, PERRLA, conjunctiva and sclera clear  ENT: O/P Clear  NECK: Supple, No JVD  NERVOUS SYSTEM:  No focal deficits  CHEST/LUNG: Clear to percussion bilaterally; No rales, rhonchi, wheezing  HEART: Regular rate and rhythm; No murmurs, rubs, or gallops  ABDOMEN: Soft, Nontender, Nondistended; Bowel sounds present  EXTREMITIES:  2+ Peripheral Pulses, No clubbing, cyanosis, or edema  SKIN: No rashes or lesions    LABS:                        13.4   10.18 )-----------( 222      ( 05 Mar 2020 14:21 )             42.5     03-05    142  |  107  |  20  ----------------------------<  110<H>  3.9   |  28  |  1.34<H>    Ca    9.3      05 Mar 2020 14:21    TPro  7.5  /  Alb  3.2<L>  /  TBili  0.6  /  DBili  x   /  AST  16  /  ALT  19  /  AlkPhos  73  03-05    PT/INR - ( 05 Mar 2020 16:05 )   PT: 12.1 sec;   INR: 1.08 ratio         PTT - ( 05 Mar 2020 16:05 )  PTT:35.3 sec  Urinalysis Basic - ( 05 Mar 2020 15:10 )    Color: Yellow / Appearance: Clear / S.005 / pH: x  Gluc: x / Ketone: Negative  / Bili: Negative / Urobili: Negative mg/dL   Blood: x / Protein: 15 mg/dL / Nitrite: Negative   Leuk Esterase: Negative / RBC: 0-2 /HPF / WBC Negative   Sq Epi: x / Non Sq Epi: Negative / Bacteria: Negative      CAPILLARY BLOOD GLUCOSE      POCT Blood Glucose.: 255 mg/dL (06 Mar 2020 11:56)  POCT Blood Glucose.: 271 mg/dL (06 Mar 2020 11:00)  POCT Blood Glucose.: 241 mg/dL (06 Mar 2020 08:11)  POCT Blood Glucose.: 296 mg/dL (05 Mar 2020 21:17)  POCT Blood Glucose.: 244 mg/dL (05 Mar 2020 16:19)  POCT Blood Glucose.: 153 mg/dL (05 Mar 2020 14:56)  POCT Blood Glucose.: 101 mg/dL (05 Mar 2020 14:01)  POCT Blood Glucose.: 68 mg/dL (05 Mar 2020 13:14)  POCT Blood Glucose.: 58 mg/dL (05 Mar 2020 12:49)      RADIOLOGY & ADDITIONAL TESTS:    Imaging Personally Reviewed:  [ ] YES  [ ] NO    Consultant(s) Notes Reviewed:  [ ] YES  [ ] NO    Care Discussed with Consultants/Other Providers [ ] YES  [ ] NO Patient is a 74y old  Male who presents with a chief complaint of Hypoglycemia (05 Mar 2020 15:58)      INTERVAL HPI/OVERNIGHT EVENTS:    Kept on D5W drip overnight at 50cc/hr; no further hypoglycemia    Per pt and family, has not had any hypoglycemic episodes in "many months"    REVIEW OF SYSTEMS:   Remaining ROS negative    MEDICATIONS  (STANDING):  aspirin enteric coated 81 milliGRAM(s) Oral daily  atorvastatin 40 milliGRAM(s) Oral at bedtime  carvedilol 12.5 milliGRAM(s) Oral every 12 hours  dextrose 5%. 1000 milliLiter(s) (50 mL/Hr) IV Continuous <Continuous>  dextrose 50% Injectable 12.5 Gram(s) IV Push once  dextrose 50% Injectable 25 Gram(s) IV Push once  dextrose 50% Injectable 25 Gram(s) IV Push once  insulin lispro (HumaLOG) corrective regimen sliding scale   SubCutaneous three times a day before meals  insulin lispro (HumaLOG) corrective regimen sliding scale   SubCutaneous at bedtime  losartan 100 milliGRAM(s) Oral daily  NIFEdipine XL 30 milliGRAM(s) Oral daily  polyethylene glycol 3350 17 Gram(s) Oral at bedtime  senna 1 Tablet(s) Oral at bedtime    MEDICATIONS  (PRN):  bisacodyl 5 milliGRAM(s) Oral every 12 hours PRN Constipation  dextrose 40% Gel 15 Gram(s) Oral once PRN Blood Glucose LESS THAN 70 milliGRAM(s)/deciliter  glucagon  Injectable 1 milliGRAM(s) IntraMuscular once PRN Glucose LESS THAN 70 milligrams/deciliter      Allergies    No Known Allergies    Intolerances        Vital Signs Last 24 Hrs  T(C): 36.2 (06 Mar 2020 06:27), Max: 36.8 (05 Mar 2020 16:09)  T(F): 97.1 (06 Mar 2020 06:27), Max: 98.3 (05 Mar 2020 16:09)  HR: 62 (06 Mar 2020 06:27) (56 - 77)  BP: 121/63 (06 Mar 2020 06:27) (114/62 - 171/86)  BP(mean): --  RR: 18 (06 Mar 2020 06:27) (18 - 18)  SpO2: 95% (06 Mar 2020 06:27) (95% - 100%)    PHYSICAL EXAM:  GENERAL: NAD  HEAD:  Atraumatic, Normocephalic  EYES: EOMI, PERRLA, conjunctiva and sclera clear  ENT: O/P Clear  NECK: Supple, No JVD  NERVOUS SYSTEM:  No focal deficits  CHEST/LUNG: Clear to percussion bilaterally; No rales, rhonchi, wheezing  HEART: Regular rate and rhythm; No murmurs, rubs, or gallops  ABDOMEN: Soft, Nontender, Nondistended; Bowel sounds present  EXTREMITIES:  2+ Peripheral Pulses, No clubbing, cyanosis, or edema  SKIN: No rashes or lesions    LABS:                        13.4   10.18 )-----------( 222      ( 05 Mar 2020 14:21 )             42.5     03-05    142  |  107  |  20  ----------------------------<  110<H>  3.9   |  28  |  1.34<H>    Ca    9.3      05 Mar 2020 14:21    TPro  7.5  /  Alb  3.2<L>  /  TBili  0.6  /  DBili  x   /  AST  16  /  ALT  19  /  AlkPhos  73  03-05    PT/INR - ( 05 Mar 2020 16:05 )   PT: 12.1 sec;   INR: 1.08 ratio         PTT - ( 05 Mar 2020 16:05 )  PTT:35.3 sec  Urinalysis Basic - ( 05 Mar 2020 15:10 )    Color: Yellow / Appearance: Clear / S.005 / pH: x  Gluc: x / Ketone: Negative  / Bili: Negative / Urobili: Negative mg/dL   Blood: x / Protein: 15 mg/dL / Nitrite: Negative   Leuk Esterase: Negative / RBC: 0-2 /HPF / WBC Negative   Sq Epi: x / Non Sq Epi: Negative / Bacteria: Negative      CAPILLARY BLOOD GLUCOSE      POCT Blood Glucose.: 255 mg/dL (06 Mar 2020 11:56)  POCT Blood Glucose.: 271 mg/dL (06 Mar 2020 11:00)  POCT Blood Glucose.: 241 mg/dL (06 Mar 2020 08:11)  POCT Blood Glucose.: 296 mg/dL (05 Mar 2020 21:17)  POCT Blood Glucose.: 244 mg/dL (05 Mar 2020 16:19)  POCT Blood Glucose.: 153 mg/dL (05 Mar 2020 14:56)  POCT Blood Glucose.: 101 mg/dL (05 Mar 2020 14:01)  POCT Blood Glucose.: 68 mg/dL (05 Mar 2020 13:14)  POCT Blood Glucose.: 58 mg/dL (05 Mar 2020 12:49)      RADIOLOGY & ADDITIONAL TESTS:    Imaging Personally Reviewed:  [ ] YES  [ ] NO    Consultant(s) Notes Reviewed:  [ ] YES  [ ] NO    Care Discussed with Consultants/Other Providers [ ] YES  [ ] NO

## 2020-03-06 NOTE — DISCHARGE NOTE NURSING/CASE MANAGEMENT/SOCIAL WORK - PATIENT PORTAL LINK FT
You can access the FollowMyHealth Patient Portal offered by [facility] by registering at the following website: [email or web address]. By joining [facility] FollowMyHealth portal, you will also be able to view your health information using other applications (apps) compatible with our system.

## 2020-03-06 NOTE — PROGRESS NOTE ADULT - PROBLEM SELECTOR PLAN 2
Normally takes Metformin 500mg BID, Basaglar 8 units QHS  Finger sticks every ac/hs with humalog insulin sliding scale coverage   A1c = 9.8  holding metformin for now  Will stop IV D5W and follow with sliding scale A1c = 9.8  Normally takes:    Basaglar 10 units QHS    Novolog QAM --      for BS < 170: none      171 - 200: 8 units      > 200: 10 units   (per pt and family, he does NOT take the Metformin)  He has a primary-care home-visit doc who manages his DM  Will stop IV D5W and follow with sliding scale + restart of QHS Basaglar  Due to the hypoglycemia in conjunction w/ high A1c, will ask Endocrine to weigh in  Possibly home later today, depending on endocrine findings/recommendations A1c = 9.8  Normally takes:    Basaglar 10 units QHS    Novolog QAM --      for BS < 170: none      171 - 200: 8 units      > 200: 10 units   (per pt and family, he does NOT take the Metformin)  He has a primary-care home-visit doc who manages his DM  Will stop IV D5W and restart regimen similar to his home regimen  Home later today

## 2020-03-06 NOTE — DISCHARGE NOTE PROVIDER - HOSPITAL COURSE
74 year old male with PMH DM, HTN, MI presented to ED with hypoglycemia. Per EMS pt was confused in the bus, had blood sugar of lower than 20 pt received dextro 50% iv and then pt ate a chocolate now pt is drinking juice and eating sandwiches in the stretcher. Pt is alert and oriented x 3; states he thinks he gave himself too much Novolog this morning by mistake. Pt denies headache, dizziness, blurred visions, light sensitivities, focal/distal weakness or numbness, cough, sob, chest pain, nausea, vomiting, fever, chills, abd pain, dysuria or irregular bowel movements.    In ED pt received D5W @ 75 ml/ hr             Problem/Plan - 1:    ·  Problem: Hypoglycemia.      Plan: Admitted to med-surg     D5W infusion now stopped     Has been on SSI only.          Problem/Plan - 2:    ·  Problem: Type 2 diabetes mellitus without complication, with long-term current use of insulin.  Plan: A1c = 9.8    Normally takes:      Basaglar 10 units QHS      Novolog QAM --        for BS < 170: none        171 - 200: 8 units        > 200: 10 units     (per pt and family, he does NOT take the Metformin)    He has a primary-care home-visit doc who manages his DM    Will stop IV D5W and restart regimen similar to his home regimen    Home later today.             Problem/Plan - 3:    ·  Problem: Essential hypertension.  Plan: cont home meds- losartan, carvedilol, nifedipine.          Problem/Plan - 4:    ·  Problem: Hyperlipidemia, unspecified hyperlipidemia type.  Plan: cont atorvastatin.

## 2020-03-06 NOTE — PROGRESS NOTE ADULT - PROBLEM SELECTOR PLAN 1
Admitted to med-surg  cont D5W infusion for now  Finger sticks every ac/hs with low dose humalog insulin sliding scale coverage Admitted to med-surg  D5W infusion now stopped  Has been on SSI only

## 2020-03-06 NOTE — PROGRESS NOTE ADULT - ASSESSMENT
74 year old male with PMH DM, HTN, MI presented to ED with hypoglycemia. Per EMS pt was confused in the bus, had blood sugar of lower than 20 pt received dextro 50% iv and then pt ate a chocolate now pt is drinking juice and eating sandwiches in the stretcher. Pt is alert and oriented x 3; states he thinks he gave himself too much novolog this morning by mistake. Pt denies headache, dizziness, blurred visions, light sensitivities, focal/distal weakness or numbness, cough, sob, chest pain, nausea, vomiting, fever, chills, abd pain, dysuria or irregular bowel movements.  In ED pt received D5W @ 75 ml/ hr

## 2020-03-09 ENCOUNTER — APPOINTMENT (OUTPATIENT)
Dept: HOME HEALTH SERVICES | Facility: HOME HEALTH | Age: 75
End: 2020-03-09
Payer: MEDICARE

## 2020-03-09 VITALS
DIASTOLIC BLOOD PRESSURE: 80 MMHG | HEART RATE: 76 BPM | OXYGEN SATURATION: 97 % | SYSTOLIC BLOOD PRESSURE: 180 MMHG | RESPIRATION RATE: 16 BRPM

## 2020-03-09 PROCEDURE — 99350 HOME/RES VST EST HIGH MDM 60: CPT

## 2020-03-09 RX ORDER — FLASH GLUCOSE SENSOR
KIT MISCELLANEOUS
Qty: 4 | Refills: 3 | Status: COMPLETED | COMMUNITY
Start: 2019-12-23 | End: 2020-03-09

## 2020-03-09 RX ORDER — FLASH GLUCOSE SCANNING READER
EACH MISCELLANEOUS
Qty: 1 | Refills: 0 | Status: COMPLETED | COMMUNITY
Start: 2019-12-23 | End: 2020-03-09

## 2020-03-09 NOTE — COUNSELING
[Overweight - ( BMI 25.1 - 29.9 )] : overweight -  ( BMI 25.1 - 29.9 ) [Continue diet as tolerated] : continue diet as tolerated based on goals of care [Non - Smoker] : non-smoker [Smoke/CO Detectors] : smoke/CO detectors [Use assistive device to avoid falls] : use assistive device to avoid falls [Decrease hospital use] : decrease hospital use [Likely to achieve goals/desired outcomes] : likely to achieve goals/desired outcomes

## 2020-03-09 NOTE — CHRONIC CARE ASSESSMENT
[PPS Score: ____] : Palliative Performance Scale (PPS) Score: [unfilled] [de-identified] : none [de-identified] : regular diet

## 2020-03-09 NOTE — PHYSICAL EXAM
[No Acute Distress] : no acute distress [Normal Sclera/Conjunctiva] : normal sclera/conjunctiva [Normal Outer Ear/Nose] : the ears and nose were normal in appearance [Normal Rate] : heart rate was normal  [Regular Rhythm] : with a regular rhythm [Normal S1, S2] : normal S1 and S2 [Normal Bowel Sounds] : normal bowel sounds [Non Tender] : non-tender [No Rash] : no rash [No Motor Deficits] : the motor exam was normal [No Gross Sensory Deficits] : no gross sensory deficits [Normal Affect] : the affect was normal [No Respiratory Distress] : no respiratory distress [de-identified] : overweight [de-identified] : unsteady gait

## 2020-03-09 NOTE — REASON FOR VISIT
[Follow-Up] : a follow-up visit [Family Member] : family member [FreeTextEntry1] : dementia, HTN [FreeTextEntry3] : [place]

## 2020-03-09 NOTE — CURRENT MEDS
[Medication and Allergies Reconciled] : medication and allergies reconciled [High Risk Medications Reviewed and Reconciled (Beers Criteria)] : high risk medications reviewed and reconciled [Reviewed patient reported medication adherence from Comprehensive Assessment] : Reviewed patient reported medication adherence from comprehensive assessment [de-identified] : imperfect adherence

## 2020-03-09 NOTE — HISTORY OF PRESENT ILLNESS
[Patient] : patient [Family Member] : family member [FreeTextEntry2] :  74M h/o HTN, T2DM, CAD (s/p stent), CVA (residual memory loss, AAOx2), dementia\par \par DM- on lantus - taking 10-20 units QHS.  Has been measuring BS after eating rather than before. A1c 9.5 in feb\par Had hypoglycemic episode on the bus. went to [facility]-- responded to PO juice\par \par HTN on carvedilol 12.5 BID, chlorthalidone 25, losartan 100, nifedipine 30. Dtr continues to give meds at irregular times\par \par High chol-on atorvastatin 40\par \par Dementia- on memantine. forgetful. Seen by [place] and started on celexa 10\par \par constipation- lactulose is effective.\par

## 2020-03-11 DIAGNOSIS — E78.5 HYPERLIPIDEMIA, UNSPECIFIED: ICD-10-CM

## 2020-03-11 DIAGNOSIS — Z87.891 PERSONAL HISTORY OF NICOTINE DEPENDENCE: ICD-10-CM

## 2020-03-11 DIAGNOSIS — F03.90 UNSPECIFIED DEMENTIA WITHOUT BEHAVIORAL DISTURBANCE: ICD-10-CM

## 2020-03-11 DIAGNOSIS — Z79.82 LONG TERM (CURRENT) USE OF ASPIRIN: ICD-10-CM

## 2020-03-11 DIAGNOSIS — I25.2 OLD MYOCARDIAL INFARCTION: ICD-10-CM

## 2020-03-11 DIAGNOSIS — E11.649 TYPE 2 DIABETES MELLITUS WITH HYPOGLYCEMIA WITHOUT COMA: ICD-10-CM

## 2020-03-11 DIAGNOSIS — I10 ESSENTIAL (PRIMARY) HYPERTENSION: ICD-10-CM

## 2020-03-11 DIAGNOSIS — Z79.84 LONG TERM (CURRENT) USE OF ORAL HYPOGLYCEMIC DRUGS: ICD-10-CM

## 2020-03-18 ENCOUNTER — APPOINTMENT (OUTPATIENT)
Dept: HOME HEALTH SERVICES | Facility: HOME HEALTH | Age: 75
End: 2020-03-18

## 2020-03-18 RX ORDER — GABAPENTIN 100 MG/1
100 CAPSULE ORAL
Qty: 30 | Refills: 0 | Status: COMPLETED | COMMUNITY
Start: 2019-11-13

## 2020-03-18 RX ORDER — INSULIN ASPART 100 [IU]/ML
(70-30) 100 INJECTION, SUSPENSION SUBCUTANEOUS
Qty: 15 | Refills: 0 | Status: COMPLETED | COMMUNITY
Start: 2019-11-13

## 2020-06-05 ENCOUNTER — APPOINTMENT (OUTPATIENT)
Dept: HOME HEALTH SERVICES | Facility: HOME HEALTH | Age: 75
End: 2020-06-05
Payer: MEDICARE

## 2020-06-05 PROCEDURE — 99349 HOME/RES VST EST MOD MDM 40: CPT | Mod: 95

## 2020-06-05 NOTE — HISTORY OF PRESENT ILLNESS
[FreeTextEntry2] : URIAH PAPPAS is being seen for a visit provided via telehealth via [facility]. This visit was first attempted using [facility] real-time audio visual technology, but was unable to be completed.  URIAH PAPPAS was [place] [address] [address], at the time of the visit.  The [facility] clinician, JOEL TOMLIN, was located remotely at her home in [place] at the time of the visit. The patient, URIAH PAPPAS, and the [facility] clinician, JOEL TOMLIN, participated in the telehealth encounter.  Other participants included: dtr Ilda.  \par URIAH PAPPAS  or his/her representative consents to the use of telehealth. All questions related to telehealth answered. The video was inconsistent and the rest of the call was conducted by phone\par \par 74M h/o HTN, T2DM, CAD (s/p stent), CVA (residual memory loss, AAOx2), dementia\par \par \par DM- on lantus - taking 10-20 units QHS. Takes varying amounts of novolog during the day.. BS now 440. Pt is requesting cristian monitor but this will not be covered by insurance (~$130/mo). \par HTN on carvedilol 12.5 BID, chlorthalidone 25, losartan 100, nifedipine 60. cannot find his BP monitor\par High chol-on atorvastatin 40\par Dementia- Pt feels his memory is worsening. is using "alphabrain memory and focus" and stopped memantine.  asking for an xray to evaluate his memory (hx CVA)\par Depression- on celexa 10-- takes irregularly. no change in mood per dtr\par constipation- lactulose is effective, but has not taken last few days and is constipated now\par

## 2020-06-05 NOTE — PHYSICAL EXAM
[No Acute Distress] : no acute distress [Well Nourished] : well nourished [No Respiratory Distress] : no respiratory distress [Normal Affect] : the affect was normal

## 2020-06-12 LAB
ANION GAP SERPL CALC-SCNC: 17 MMOL/L
BUN SERPL-MCNC: 19 MG/DL
CALCIUM SERPL-MCNC: 9.3 MG/DL
CHLORIDE SERPL-SCNC: 93 MMOL/L
CHOLEST SERPL-MCNC: 117 MG/DL
CHOLEST/HDLC SERPL: 2.4 RATIO
CO2 SERPL-SCNC: 26 MMOL/L
CREAT SERPL-MCNC: 1.39 MG/DL
ESTIMATED AVERAGE GLUCOSE: 263 MG/DL
GLUCOSE SERPL-MCNC: 456 MG/DL
HBA1C MFR BLD HPLC: 10.8 %
HDLC SERPL-MCNC: 48 MG/DL
LDLC SERPL CALC-MCNC: 56 MG/DL
POTASSIUM SERPL-SCNC: 3.2 MMOL/L
SODIUM SERPL-SCNC: 136 MMOL/L
TRIGL SERPL-MCNC: 61 MG/DL

## 2020-09-17 ENCOUNTER — APPOINTMENT (OUTPATIENT)
Dept: HOME HEALTH SERVICES | Facility: HOME HEALTH | Age: 75
End: 2020-09-17
Payer: MEDICARE

## 2020-09-17 DIAGNOSIS — F03.90 UNSPECIFIED DEMENTIA W/OUT BEHAVIORAL DISTURBANCE: ICD-10-CM

## 2020-09-17 DIAGNOSIS — K59.09 OTHER CONSTIPATION: ICD-10-CM

## 2020-09-17 DIAGNOSIS — Z86.73 PERSONAL HISTORY OF TRANSIENT ISCHEMIC ATTACK (TIA), AND CEREBRAL INFARCTION W/OUT RESIDUAL DEFICITS: ICD-10-CM

## 2020-09-17 DIAGNOSIS — R80.9 TYPE 2 DIABETES MELLITUS WITH OTHER DIABETIC KIDNEY COMPLICATION: ICD-10-CM

## 2020-09-17 DIAGNOSIS — Z71.89 OTHER SPECIFIED COUNSELING: ICD-10-CM

## 2020-09-17 DIAGNOSIS — I10 ESSENTIAL (PRIMARY) HYPERTENSION: ICD-10-CM

## 2020-09-17 DIAGNOSIS — F32.0 MAJOR DEPRESSIVE DISORDER, SINGLE EPISODE, MILD: ICD-10-CM

## 2020-09-17 DIAGNOSIS — E11.29 TYPE 2 DIABETES MELLITUS WITH OTHER DIABETIC KIDNEY COMPLICATION: ICD-10-CM

## 2020-09-17 PROCEDURE — G0439: CPT | Mod: 95

## 2020-09-17 PROCEDURE — 99349 HOME/RES VST EST MOD MDM 40: CPT | Mod: 25,95

## 2020-09-17 RX ORDER — BLOOD-GLUCOSE METER
W/DEVICE KIT MISCELLANEOUS
Qty: 1 | Refills: 0 | Status: ACTIVE | COMMUNITY
Start: 2020-07-20

## 2020-09-17 RX ORDER — MEMANTINE HYDROCHLORIDE 10 MG/1
10 TABLET, FILM COATED ORAL
Qty: 180 | Refills: 2 | Status: ACTIVE | COMMUNITY
Start: 2019-12-23

## 2020-09-17 RX ORDER — ATORVASTATIN CALCIUM 40 MG/1
40 TABLET, FILM COATED ORAL
Qty: 1 | Refills: 3 | Status: ACTIVE | COMMUNITY
Start: 2019-12-23

## 2020-09-17 RX ORDER — BLOOD SUGAR DIAGNOSTIC
STRIP MISCELLANEOUS TWICE DAILY
Qty: 1 | Refills: 5 | Status: ACTIVE | COMMUNITY
Start: 2020-02-04

## 2020-09-17 RX ORDER — NIFEDIPINE 60 MG/1
60 TABLET, FILM COATED, EXTENDED RELEASE ORAL DAILY
Qty: 90 | Refills: 3 | Status: ACTIVE | COMMUNITY

## 2020-09-17 RX ORDER — LANCETS 33 GAUGE
EACH MISCELLANEOUS
Qty: 100 | Refills: 5 | Status: ACTIVE | COMMUNITY
Start: 2020-07-20

## 2020-09-17 RX ORDER — SENNOSIDES 8.6 MG
8.6 TABLET ORAL
Qty: 100 | Refills: 5 | Status: ACTIVE | COMMUNITY
Start: 2020-01-08

## 2020-09-17 RX ORDER — INSULIN GLARGINE 100 [IU]/ML
100 INJECTION, SOLUTION SUBCUTANEOUS
Qty: 1 | Refills: 3 | Status: ACTIVE | COMMUNITY

## 2020-09-17 RX ORDER — CHLORTHALIDONE 25 MG/1
25 TABLET ORAL DAILY
Qty: 90 | Refills: 3 | Status: ACTIVE | COMMUNITY
Start: 2019-12-23

## 2020-09-17 RX ORDER — LACTULOSE 10 G/15ML
20 SOLUTION ORAL
Qty: 500 | Refills: 3 | Status: ACTIVE | COMMUNITY
Start: 2019-12-23

## 2020-09-17 RX ORDER — LATANOPROST/PF 0.005 %
0.01 DROPS OPHTHALMIC (EYE)
Qty: 3 | Refills: 3 | Status: ACTIVE | COMMUNITY
Start: 2019-02-07

## 2020-09-17 RX ORDER — CARVEDILOL 12.5 MG/1
12.5 TABLET, FILM COATED ORAL
Qty: 180 | Refills: 3 | Status: ACTIVE | COMMUNITY

## 2020-09-17 RX ORDER — INSULIN ASPART 100 [IU]/ML
100 INJECTION, SOLUTION INTRAVENOUS; SUBCUTANEOUS
Qty: 1 | Refills: 3 | Status: ACTIVE | COMMUNITY
Start: 2020-02-04

## 2020-09-17 RX ORDER — CITALOPRAM HYDROBROMIDE 10 MG/1
10 TABLET, FILM COATED ORAL DAILY
Qty: 30 | Refills: 2 | Status: ACTIVE | COMMUNITY
Start: 2020-03-09

## 2020-09-17 RX ORDER — LOSARTAN POTASSIUM 100 MG/1
100 TABLET, FILM COATED ORAL
Qty: 90 | Refills: 3 | Status: ACTIVE | COMMUNITY

## 2020-09-21 PROBLEM — F03.90 DEMENTIA: Status: ACTIVE | Noted: 2019-12-23

## 2020-09-21 PROBLEM — Z71.89 ADVANCE CARE PLANNING: Status: ACTIVE | Noted: 2020-09-21

## 2020-09-21 PROBLEM — F32.0 CURRENT MILD EPISODE OF MAJOR DEPRESSIVE DISORDER WITHOUT PRIOR EPISODE: Status: ACTIVE | Noted: 2020-03-09

## 2020-09-21 PROBLEM — E11.29 TYPE 2 DIABETES MELLITUS WITH PROTEINURIA: Status: ACTIVE | Noted: 2019-12-23

## 2020-09-21 PROBLEM — K59.09 CONSTIPATION, CHRONIC: Status: ACTIVE | Noted: 2019-12-23

## 2020-09-21 NOTE — REASON FOR VISIT
[Follow-Up] : a follow-up visit [Pre-Visit Preparation] : pre-visit preparation was done [Intercurrent Specialty/Sub-specialty Visits] : the patient has no intercurrent specialty/sub-specialty visits [FreeTextEntry1] : DM

## 2020-09-21 NOTE — HEALTH RISK ASSESSMENT
[HRA Reviewed] : Health risk assessment reviewed [Independent] : feeding [Some assistance needed] : walking [No falls in past year] : Patient reported no falls in the past year [TimeGetUpGo] : n/a

## 2020-09-21 NOTE — HISTORY OF PRESENT ILLNESS
[FreeTextEntry2] : URIAH PAPPAS is being seen for a visit provided via telehealth via what[employer] mian. This visit was first attempted using depict real-time audio visual technology, but was unable to be completed.\par URIAH PAPPAS was located at their [place], [address]\[place] [address], at the time of the visit.\par The [facility] clinician, TERRA MATA, was located remotely at their home in [place] at the time of the visit. \par The patient, URIAH PAPPAS, and the [facility] clinician, TERRA MATA, participated in the telehealth encounter.\par Other participants included: dtr \par URIAH PAPPAS (Dec  9 1945) or his/her representative consents to the use of telehealth. All questions related to telehealth answered\par  \par 74M h/o HTN, T2DM, CAD (s/p stent), CVA (residual memory loss, AAOx2), dementia\par \par DM- on lantus - taking 10-20 units QHS. Takes varying amounts of novolog during the day. Fairly well-controlled. Fasting BS now 116. BG checks have not been > 200. Pt is requesting cristian monitor but this will not be covered by insurance (~$130/mo). \par HTN on carvedilol 12.5 BID, chlorthalidone 25, losartan 100, nifedipine 60. cannot find his BP monitor\par High chol-on atorvastatin 40\par Dementia- On memantine \par Depression- on celexa 10-- takes irregularly. no change in mood per dtr. Encouraged regular use for depression. \par constipation- lactulose is effective, but has not taken last few days and is constipated now, senna tea \par \par Denies fever, SOB, BLE edema, cough.

## 2020-09-21 NOTE — CHRONIC CARE ASSESSMENT
[PPS Score: ____] : Palliative Performance Scale (PPS) Score: [unfilled] [de-identified] : n/a [de-identified] : adequate

## 2020-09-21 NOTE — COUNSELING
[Normal Weight - ( BMI  <25 )] : normal weight - ( BMI  <25 ) [Continue diet as tolerated] : continue diet as tolerated based on goals of care [Non - Smoker] : non-smoker [Smoke/CO Detectors] : smoke/CO detectors [Use assistive device to avoid falls] : use assistive device to avoid falls [] : foot exam [Improve pain control] : improve pain control [Decrease stress] : decrease stress [Decrease hospital use] : decrease hospital use [Likely to achieve goals/desired outcomes] : likely to achieve goals/desired outcomes [Advanced Directives discussed: ____] : Advanced directives discussed: [unfilled] [Completed Medical Orders for Life-Sustaining Treatment] : completed medical orders for life-sustaining treatment [Full Code] : Code Status: Full Code [No Limitations] : Treatment Guidelines: No limitations [Long Term Intubation] : Intubation: Long term intubation [[facility] Date: _____] [facility]/last verification date: [unfilled] [Established patient, extensive review of history, medical and functional status, risk factors and patient education] : Established patient, extensive review of history, medical and functional status, risk factors and patient education and counseling provided for subsequent annual wellness visit

## 2020-09-21 NOTE — PHYSICAL EXAM
[No Acute Distress] : no acute distress [Well Nourished] : well nourished [No Respiratory Distress] : no respiratory distress [Normal Affect] : the affect was normal [Normal Sclera/Conjunctiva] : normal sclera/conjunctiva [Normal Outer Ear/Nose] : the ears and nose were normal in appearance [Supple] : the neck was supple [No Accessory Muscle Use] : no accessory muscle use [No Edema] : there was no peripheral edema [No Joint Swelling] : no joint swelling seen [No Rash] : no rash [No Skin Lesions] : no skin lesions

## 2020-09-24 LAB
ALBUMIN SERPL ELPH-MCNC: 4 G/DL
ANION GAP SERPL CALC-SCNC: 18 MMOL/L
BUN SERPL-MCNC: 19 MG/DL
CALCIUM SERPL-MCNC: 9.7 MG/DL
CHLORIDE SERPL-SCNC: 96 MMOL/L
CO2 SERPL-SCNC: 28 MMOL/L
CREAT SERPL-MCNC: 1.35 MG/DL
ESTIMATED AVERAGE GLUCOSE: 229 MG/DL
GLUCOSE SERPL-MCNC: 104 MG/DL
HBA1C MFR BLD HPLC: 9.6 %
PHOSPHATE SERPL-MCNC: 1.9 MG/DL
POTASSIUM SERPL-SCNC: 3.5 MMOL/L
SODIUM SERPL-SCNC: 142 MMOL/L
TSH SERPL-ACNC: 3.69 UIU/ML

## 2021-03-22 NOTE — H&P ADULT - PROBLEM/PLAN-2
Moe Fay)  Cardiology; Internal Medicine  [address], [address]  [place] [address]  Phone: [phone]  Fax: [phone]  Follow Up Time:    DISPLAY PLAN FREE TEXT

## 2022-02-17 NOTE — ED ADULT TRIAGE NOTE - PAIN: PRESENCE, MLM
Caro Leblanc denies pain/discomfort

## 2023-03-10 NOTE — ED ADULT TRIAGE NOTE - CCCP TRG CHIEF CMPLNT
hypoglycemia Eye Protection Verbiage: Before proceeding with the stage, a plastic scleral shield was inserted. The globe was anesthetized with a few drops of 1% lidocaine with 1:100,000 epinephrine. Then, an appropriate sized scleral shield was chosen and coated with lacrilube ointment. The shield was gently inserted and left in place for the duration of each stage. After the stage was completed, the shield was gently removed.

## 2024-03-06 NOTE — ED PROVIDER NOTE - NEUROLOGICAL, MLM
Emergency Department Provider Note       PCP: No primary care provider on file.   Age: 41 y.o.   Sex: male     DISPOSITION Decision To Discharge 03/06/2024 08:48:51 AM       ICD-10-CM    1. Perforation of left tympanic membrane  H72.92 [facility]          Medical Decision Making     Patient has a left TM rupture due to traumatic perforation from a Q-tip.  There is no gross contamination.  Will give follow-up with ENT.  Instructed on keeping the canal dry free of fluid.     1 acute, uncomplicated illness or injury.  Prescription drug management performed.    I independently ordered and reviewed each unique test.                     History     41-year-old male presents emergency department complaint left ear pain.  Patient states that he had a Q-tip in his ear when he dropped something.  He left a Q-tip in his ear, the different exception of the phlegm and he put his left hand back up to grab a Q-tip he accidentally forced a Q-tip in against his eardrum.  He states that he had immediate onset of pain but no drainage.  However this morning he woke up and noticed that there was some blood at the meatus of his ear canal.  He also notes that he is feeling vibrations and sounds that feel different than normal.  In addition, he did have a significant episode of dizziness with change in position of his head after coughing.  He denies any other injuries, no fevers, no other drainage from the ear.        Physical Exam     Vitals signs and nursing note reviewed:  Vitals:    03/06/24 0835   BP: (!) 162/99   Pulse: 98   Resp: 16   Temp: 97.9 °F (36.6 °C)   TempSrc: Oral   SpO2: 98%   Weight: 98.4 kg (217 lb)   Height: 1.676 m (5' 6\")      Physical Exam  Constitutional:       Appearance: Normal appearance.   HENT:      Head: Normocephalic.      Ears:        Comments: Perforation of the left eardrum with surrounding erythema of the left tympanic membrane, there is dried blood in the inferior portion of the 
No focal deficits, no motor or sensory deficits.

## 2024-10-16 NOTE — ED PROVIDER NOTE - CPE EDP [facility] NORM
Bed/Stretcher in lowest position, wheels locked, appropriate side rails in place/Call bell, personal items and telephone in reach/Instruct patient to call for assistance before getting out of bed/chair/stretcher/Non-slip footwear applied when patient is off stretcher/[place] to call system/Physically safe environment - no spills, clutter or unnecessary equipment/Purposeful proactive rounding/Room/bathroom lighting operational, light cord in reach - - -

## 2025-05-12 NOTE — PATIENT PROFILE ADULT - NSPROGENSOURCEINFO_GEN_A_NUR
Last office visit date: 4/30/2025    Next appointment scheduled?:9/19/2025      Number of refills given: 4    
patient